# Patient Record
Sex: FEMALE | Race: WHITE | Employment: OTHER | URBAN - NONMETROPOLITAN AREA
[De-identification: names, ages, dates, MRNs, and addresses within clinical notes are randomized per-mention and may not be internally consistent; named-entity substitution may affect disease eponyms.]

---

## 2017-09-02 ENCOUNTER — APPOINTMENT (OUTPATIENT)
Dept: GENERAL RADIOLOGY | Age: 82
DRG: 884 | End: 2017-09-02
Payer: MEDICARE

## 2017-09-02 ENCOUNTER — APPOINTMENT (OUTPATIENT)
Dept: CT IMAGING | Age: 82
DRG: 884 | End: 2017-09-02
Payer: MEDICARE

## 2017-09-02 ENCOUNTER — HOSPITAL ENCOUNTER (INPATIENT)
Age: 82
LOS: 4 days | Discharge: NURSING, REHAB OR LONG-TERM CARE | DRG: 884 | End: 2017-09-06
Attending: EMERGENCY MEDICINE | Admitting: INTERNAL MEDICINE
Payer: MEDICARE

## 2017-09-02 DIAGNOSIS — N39.0 URINARY TRACT INFECTION WITHOUT HEMATURIA, SITE UNSPECIFIED: ICD-10-CM

## 2017-09-02 DIAGNOSIS — R27.0 ATAXIA: Primary | ICD-10-CM

## 2017-09-02 PROBLEM — M25.579 PAIN AND SWELLING OF ANKLE: Status: ACTIVE | Noted: 2017-09-02

## 2017-09-02 PROBLEM — R29.6 MULTIPLE FALLS: Status: ACTIVE | Noted: 2017-09-02

## 2017-09-02 PROBLEM — M25.473 PAIN AND SWELLING OF ANKLE: Status: ACTIVE | Noted: 2017-09-02

## 2017-09-02 PROBLEM — F03.90 DEMENTIA (HCC): Status: ACTIVE | Noted: 2017-09-02

## 2017-09-02 PROBLEM — E03.9 HYPOTHYROIDISM: Status: ACTIVE | Noted: 2017-09-02

## 2017-09-02 PROBLEM — N30.00 ACUTE CYSTITIS: Status: ACTIVE | Noted: 2017-09-02

## 2017-09-02 LAB
ALBUMIN SERPL-MCNC: 3.8 G/DL (ref 3.5–5.1)
ALP BLD-CCNC: 57 U/L (ref 38–126)
ALT SERPL-CCNC: 20 U/L (ref 11–66)
AMMONIA: 28 UMOL/L (ref 11–60)
ANION GAP SERPL CALCULATED.3IONS-SCNC: 14 MEQ/L (ref 8–16)
AST SERPL-CCNC: 42 U/L (ref 5–40)
BACTERIA: ABNORMAL /HPF
BASOPHILS # BLD: 0.4 %
BASOPHILS ABSOLUTE: 0 THOU/MM3 (ref 0–0.1)
BILIRUB SERPL-MCNC: 1 MG/DL (ref 0.3–1.2)
BILIRUBIN DIRECT: < 0.2 MG/DL (ref 0–0.3)
BILIRUBIN URINE: NEGATIVE
BLOOD, URINE: ABNORMAL
BUN BLDV-MCNC: 23 MG/DL (ref 7–22)
CALCIUM SERPL-MCNC: 9.7 MG/DL (ref 8.5–10.5)
CASTS 2: ABNORMAL /LPF
CASTS UA: ABNORMAL /LPF
CHARACTER, URINE: ABNORMAL
CHLORIDE BLD-SCNC: 97 MEQ/L (ref 98–111)
CO2: 24 MEQ/L (ref 23–33)
COLOR: YELLOW
CREAT SERPL-MCNC: 0.7 MG/DL (ref 0.4–1.2)
CRYSTALS, UA: ABNORMAL
EOSINOPHIL # BLD: 0.2 %
EOSINOPHILS ABSOLUTE: 0 THOU/MM3 (ref 0–0.4)
EPITHELIAL CELLS, UA: ABNORMAL /HPF
GFR SERPL CREATININE-BSD FRML MDRD: 80 ML/MIN/1.73M2
GLUCOSE BLD-MCNC: 179 MG/DL (ref 70–108)
GLUCOSE URINE: 250 MG/DL
HCT VFR BLD CALC: 40 % (ref 37–47)
HEMOGLOBIN: 13.9 GM/DL (ref 12–16)
KETONES, URINE: NEGATIVE
LEUKOCYTE ESTERASE, URINE: ABNORMAL
LYMPHOCYTES # BLD: 10.9 %
LYMPHOCYTES ABSOLUTE: 0.8 THOU/MM3 (ref 1–4.8)
MCH RBC QN AUTO: 33.8 PG (ref 27–31)
MCHC RBC AUTO-ENTMCNC: 34.7 GM/DL (ref 33–37)
MCV RBC AUTO: 97.3 FL (ref 81–99)
MISCELLANEOUS 2: ABNORMAL
MONOCYTES # BLD: 10.9 %
MONOCYTES ABSOLUTE: 0.8 THOU/MM3 (ref 0.4–1.3)
NITRITE, URINE: NEGATIVE
NUCLEATED RED BLOOD CELLS: 0 /100 WBC
OSMOLALITY CALCULATION: 278.3 MOSMOL/KG (ref 275–300)
PDW BLD-RTO: 13.9 % (ref 11.5–14.5)
PH UA: 6.5
PLATELET # BLD: 111 THOU/MM3 (ref 130–400)
PMV BLD AUTO: 8.4 MCM (ref 7.4–10.4)
POTASSIUM SERPL-SCNC: 4.5 MEQ/L (ref 3.5–5.2)
PROTEIN UA: NEGATIVE
RBC # BLD: 4.11 MILL/MM3 (ref 4.2–5.4)
RBC # BLD: NORMAL 10*6/UL
RBC URINE: ABNORMAL /HPF
RENAL EPITHELIAL, UA: ABNORMAL
SEG NEUTROPHILS: 77.6 %
SEGMENTED NEUTROPHILS ABSOLUTE COUNT: 5.9 THOU/MM3 (ref 1.8–7.7)
SODIUM BLD-SCNC: 135 MEQ/L (ref 135–145)
SPECIFIC GRAVITY, URINE: 1.02 (ref 1–1.03)
TOTAL PROTEIN: 6.6 G/DL (ref 6.1–8)
TSH SERPL DL<=0.05 MIU/L-ACNC: 2.22 UIU/ML (ref 0.4–4.2)
UROBILINOGEN, URINE: 1 EU/DL
WBC # BLD: 7.6 THOU/MM3 (ref 4.8–10.8)
WBC UA: ABNORMAL /HPF
YEAST: ABNORMAL

## 2017-09-02 PROCEDURE — 80053 COMPREHEN METABOLIC PANEL: CPT

## 2017-09-02 PROCEDURE — 96365 THER/PROPH/DIAG IV INF INIT: CPT

## 2017-09-02 PROCEDURE — 87086 URINE CULTURE/COLONY COUNT: CPT

## 2017-09-02 PROCEDURE — 73630 X-RAY EXAM OF FOOT: CPT

## 2017-09-02 PROCEDURE — 87077 CULTURE AEROBIC IDENTIFY: CPT

## 2017-09-02 PROCEDURE — 99285 EMERGENCY DEPT VISIT HI MDM: CPT

## 2017-09-02 PROCEDURE — 73610 X-RAY EXAM OF ANKLE: CPT

## 2017-09-02 PROCEDURE — 70450 CT HEAD/BRAIN W/O DYE: CPT

## 2017-09-02 PROCEDURE — 85025 COMPLETE CBC W/AUTO DIFF WBC: CPT

## 2017-09-02 PROCEDURE — 82248 BILIRUBIN DIRECT: CPT

## 2017-09-02 PROCEDURE — G0378 HOSPITAL OBSERVATION PER HR: HCPCS

## 2017-09-02 PROCEDURE — 87186 SC STD MICRODIL/AGAR DIL: CPT

## 2017-09-02 PROCEDURE — 87184 SC STD DISK METHOD PER PLATE: CPT

## 2017-09-02 PROCEDURE — 81001 URINALYSIS AUTO W/SCOPE: CPT

## 2017-09-02 PROCEDURE — 36415 COLL VENOUS BLD VENIPUNCTURE: CPT

## 2017-09-02 PROCEDURE — 84443 ASSAY THYROID STIM HORMONE: CPT

## 2017-09-02 PROCEDURE — 93005 ELECTROCARDIOGRAM TRACING: CPT

## 2017-09-02 PROCEDURE — 2060000000 HC ICU INTERMEDIATE R&B

## 2017-09-02 PROCEDURE — 82140 ASSAY OF AMMONIA: CPT

## 2017-09-02 PROCEDURE — 99222 1ST HOSP IP/OBS MODERATE 55: CPT | Performed by: INTERNAL MEDICINE

## 2017-09-02 PROCEDURE — A6212 FOAM DRG <=16 SQ IN W/BORDER: HCPCS

## 2017-09-02 PROCEDURE — 6360000002 HC RX W HCPCS: Performed by: EMERGENCY MEDICINE

## 2017-09-02 RX ORDER — SODIUM CHLORIDE 0.9 % (FLUSH) 0.9 %
10 SYRINGE (ML) INJECTION EVERY 12 HOURS SCHEDULED
Status: DISCONTINUED | OUTPATIENT
Start: 2017-09-02 | End: 2017-09-06 | Stop reason: HOSPADM

## 2017-09-02 RX ORDER — DONEPEZIL HYDROCHLORIDE 5 MG/1
5 TABLET, FILM COATED ORAL NIGHTLY
Status: DISCONTINUED | OUTPATIENT
Start: 2017-09-02 | End: 2017-09-03

## 2017-09-02 RX ORDER — ONDANSETRON 2 MG/ML
4 INJECTION INTRAMUSCULAR; INTRAVENOUS EVERY 6 HOURS PRN
Status: DISCONTINUED | OUTPATIENT
Start: 2017-09-02 | End: 2017-09-03

## 2017-09-02 RX ORDER — LISINOPRIL 20 MG/1
20 TABLET ORAL 2 TIMES DAILY
Status: DISCONTINUED | OUTPATIENT
Start: 2017-09-03 | End: 2017-09-06 | Stop reason: HOSPADM

## 2017-09-02 RX ORDER — ACETAMINOPHEN 325 MG/1
650 TABLET ORAL EVERY 4 HOURS PRN
Status: DISCONTINUED | OUTPATIENT
Start: 2017-09-02 | End: 2017-09-06 | Stop reason: HOSPADM

## 2017-09-02 RX ORDER — GLIPIZIDE 5 MG/1
5 TABLET ORAL DAILY
Status: ON HOLD | COMMUNITY
End: 2021-08-20 | Stop reason: HOSPADM

## 2017-09-02 RX ORDER — DIVALPROEX SODIUM 125 MG/1
125 CAPSULE, COATED PELLETS ORAL NIGHTLY
COMMUNITY

## 2017-09-02 RX ORDER — OXYBUTYNIN CHLORIDE 5 MG/1
5 TABLET, EXTENDED RELEASE ORAL NIGHTLY
Status: DISCONTINUED | OUTPATIENT
Start: 2017-09-02 | End: 2017-09-03

## 2017-09-02 RX ORDER — OYSTER SHELL CALCIUM WITH VITAMIN D 500; 200 MG/1; [IU]/1
1 TABLET, FILM COATED ORAL DAILY
COMMUNITY

## 2017-09-02 RX ORDER — ASPIRIN 81 MG/1
81 TABLET ORAL DAILY
Status: DISCONTINUED | OUTPATIENT
Start: 2017-09-03 | End: 2017-09-03

## 2017-09-02 RX ORDER — SODIUM CHLORIDE 9 MG/ML
INJECTION, SOLUTION INTRAVENOUS CONTINUOUS
Status: DISCONTINUED | OUTPATIENT
Start: 2017-09-02 | End: 2017-09-06 | Stop reason: HOSPADM

## 2017-09-02 RX ORDER — LEVOTHYROXINE SODIUM 0.05 MG/1
50 TABLET ORAL DAILY
Status: DISCONTINUED | OUTPATIENT
Start: 2017-09-03 | End: 2017-09-06 | Stop reason: HOSPADM

## 2017-09-02 RX ORDER — SODIUM CHLORIDE 0.9 % (FLUSH) 0.9 %
10 SYRINGE (ML) INJECTION PRN
Status: DISCONTINUED | OUTPATIENT
Start: 2017-09-02 | End: 2017-09-06 | Stop reason: HOSPADM

## 2017-09-02 RX ORDER — LEVOFLOXACIN 5 MG/ML
500 INJECTION, SOLUTION INTRAVENOUS ONCE
Status: COMPLETED | OUTPATIENT
Start: 2017-09-02 | End: 2017-09-02

## 2017-09-02 RX ORDER — M-VIT,TX,IRON,MINS/CALC/FOLIC 27MG-0.4MG
1 TABLET ORAL DAILY
COMMUNITY

## 2017-09-02 RX ORDER — METOPROLOL SUCCINATE 100 MG/1
100 TABLET, EXTENDED RELEASE ORAL DAILY
Status: DISCONTINUED | OUTPATIENT
Start: 2017-09-03 | End: 2017-09-03

## 2017-09-02 RX ORDER — GLYBURIDE 5 MG/1
5 TABLET ORAL
Status: DISCONTINUED | OUTPATIENT
Start: 2017-09-03 | End: 2017-09-03

## 2017-09-02 RX ORDER — AMLODIPINE BESYLATE 5 MG/1
5 TABLET ORAL DAILY
Status: DISCONTINUED | OUTPATIENT
Start: 2017-09-03 | End: 2017-09-06 | Stop reason: HOSPADM

## 2017-09-02 RX ADMIN — LEVOFLOXACIN 500 MG: 5 INJECTION, SOLUTION INTRAVENOUS at 22:17

## 2017-09-02 ASSESSMENT — PAIN DESCRIPTION - DESCRIPTORS: DESCRIPTORS: DISCOMFORT

## 2017-09-02 ASSESSMENT — PAIN DESCRIPTION - PROGRESSION: CLINICAL_PROGRESSION: NOT CHANGED

## 2017-09-02 ASSESSMENT — PAIN DESCRIPTION - ONSET: ONSET: ON-GOING

## 2017-09-02 ASSESSMENT — PAIN SCALES - GENERAL: PAINLEVEL_OUTOF10: 3

## 2017-09-02 ASSESSMENT — PAIN DESCRIPTION - PAIN TYPE: TYPE: ACUTE PAIN

## 2017-09-02 ASSESSMENT — PAIN DESCRIPTION - ORIENTATION: ORIENTATION: RIGHT

## 2017-09-02 ASSESSMENT — PAIN DESCRIPTION - FREQUENCY: FREQUENCY: INTERMITTENT

## 2017-09-02 ASSESSMENT — PAIN DESCRIPTION - LOCATION: LOCATION: ANKLE

## 2017-09-03 ENCOUNTER — APPOINTMENT (OUTPATIENT)
Dept: GENERAL RADIOLOGY | Age: 82
DRG: 884 | End: 2017-09-03
Payer: MEDICARE

## 2017-09-03 PROBLEM — E11.9 TYPE 2 DIABETES MELLITUS WITHOUT COMPLICATION (HCC): Status: ACTIVE | Noted: 2017-09-03

## 2017-09-03 LAB
ANION GAP SERPL CALCULATED.3IONS-SCNC: 15 MEQ/L (ref 8–16)
BUN BLDV-MCNC: 14 MG/DL (ref 7–22)
CALCIUM SERPL-MCNC: 9 MG/DL (ref 8.5–10.5)
CHLORIDE BLD-SCNC: 97 MEQ/L (ref 98–111)
CO2: 26 MEQ/L (ref 23–33)
CREAT SERPL-MCNC: 0.5 MG/DL (ref 0.4–1.2)
EKG ATRIAL RATE: 77 BPM
EKG P AXIS: 17 DEGREES
EKG P-R INTERVAL: 170 MS
EKG Q-T INTERVAL: 444 MS
EKG QRS DURATION: 154 MS
EKG QTC CALCULATION (BAZETT): 502 MS
EKG R AXIS: -48 DEGREES
EKG T AXIS: 119 DEGREES
EKG VENTRICULAR RATE: 77 BPM
GFR SERPL CREATININE-BSD FRML MDRD: > 90 ML/MIN/1.73M2
GLUCOSE BLD-MCNC: 130 MG/DL (ref 70–108)
POTASSIUM SERPL-SCNC: 3.6 MEQ/L (ref 3.5–5.2)
SODIUM BLD-SCNC: 138 MEQ/L (ref 135–145)

## 2017-09-03 PROCEDURE — 80048 BASIC METABOLIC PNL TOTAL CA: CPT

## 2017-09-03 PROCEDURE — 36415 COLL VENOUS BLD VENIPUNCTURE: CPT

## 2017-09-03 PROCEDURE — 1200000000 HC SEMI PRIVATE

## 2017-09-03 PROCEDURE — 97166 OT EVAL MOD COMPLEX 45 MIN: CPT

## 2017-09-03 PROCEDURE — 99232 SBSQ HOSP IP/OBS MODERATE 35: CPT | Performed by: INTERNAL MEDICINE

## 2017-09-03 PROCEDURE — G0378 HOSPITAL OBSERVATION PER HR: HCPCS

## 2017-09-03 PROCEDURE — G8978 MOBILITY CURRENT STATUS: HCPCS

## 2017-09-03 PROCEDURE — 6370000000 HC RX 637 (ALT 250 FOR IP): Performed by: INTERNAL MEDICINE

## 2017-09-03 PROCEDURE — 97535 SELF CARE MNGMENT TRAINING: CPT

## 2017-09-03 PROCEDURE — G8980 MOBILITY D/C STATUS: HCPCS

## 2017-09-03 PROCEDURE — G8979 MOBILITY GOAL STATUS: HCPCS

## 2017-09-03 PROCEDURE — 2580000003 HC RX 258: Performed by: INTERNAL MEDICINE

## 2017-09-03 PROCEDURE — 72170 X-RAY EXAM OF PELVIS: CPT

## 2017-09-03 RX ORDER — GLIPIZIDE 5 MG/1
5 TABLET ORAL DAILY
Status: DISCONTINUED | OUTPATIENT
Start: 2017-09-03 | End: 2017-09-06 | Stop reason: HOSPADM

## 2017-09-03 RX ORDER — M-VIT,TX,IRON,MINS/CALC/FOLIC 27MG-0.4MG
1 TABLET ORAL DAILY
Status: DISCONTINUED | OUTPATIENT
Start: 2017-09-03 | End: 2017-09-06 | Stop reason: HOSPADM

## 2017-09-03 RX ORDER — DIVALPROEX SODIUM 125 MG/1
125 CAPSULE, COATED PELLETS ORAL NIGHTLY
Status: DISCONTINUED | OUTPATIENT
Start: 2017-09-03 | End: 2017-09-06 | Stop reason: HOSPADM

## 2017-09-03 RX ORDER — OYSTER SHELL CALCIUM WITH VITAMIN D 500; 200 MG/1; [IU]/1
1 TABLET, FILM COATED ORAL DAILY
Status: DISCONTINUED | OUTPATIENT
Start: 2017-09-03 | End: 2017-09-06 | Stop reason: HOSPADM

## 2017-09-03 RX ORDER — METOPROLOL SUCCINATE 50 MG/1
50 TABLET, EXTENDED RELEASE ORAL DAILY
Status: DISCONTINUED | OUTPATIENT
Start: 2017-09-03 | End: 2017-09-06 | Stop reason: HOSPADM

## 2017-09-03 RX ORDER — LEVOFLOXACIN 5 MG/ML
500 INJECTION, SOLUTION INTRAVENOUS EVERY 24 HOURS
Status: DISCONTINUED | OUTPATIENT
Start: 2017-09-04 | End: 2017-09-03

## 2017-09-03 RX ORDER — LEVOFLOXACIN 5 MG/ML
500 INJECTION, SOLUTION INTRAVENOUS EVERY 24 HOURS
Status: DISCONTINUED | OUTPATIENT
Start: 2017-09-03 | End: 2017-09-06 | Stop reason: HOSPADM

## 2017-09-03 RX ADMIN — GLIPIZIDE 5 MG: 5 TABLET ORAL at 08:52

## 2017-09-03 RX ADMIN — METOPROLOL SUCCINATE 50 MG: 100 TABLET, FILM COATED, EXTENDED RELEASE ORAL at 08:50

## 2017-09-03 RX ADMIN — SODIUM CHLORIDE: 9 INJECTION, SOLUTION INTRAVENOUS at 00:57

## 2017-09-03 RX ADMIN — DIVALPROEX SODIUM 125 MG: 125 CAPSULE, COATED PELLETS ORAL at 00:59

## 2017-09-03 RX ADMIN — AMLODIPINE BESYLATE 5 MG: 5 TABLET ORAL at 08:51

## 2017-09-03 RX ADMIN — LISINOPRIL 20 MG: 20 TABLET ORAL at 08:51

## 2017-09-03 RX ADMIN — LEVOTHYROXINE SODIUM 50 MCG: 50 TABLET ORAL at 06:33

## 2017-09-03 RX ADMIN — SODIUM CHLORIDE: 9 INJECTION, SOLUTION INTRAVENOUS at 15:01

## 2017-09-03 RX ADMIN — LISINOPRIL 20 MG: 20 TABLET ORAL at 17:40

## 2017-09-03 RX ADMIN — DIVALPROEX SODIUM 125 MG: 125 CAPSULE, COATED PELLETS ORAL at 21:05

## 2017-09-03 RX ADMIN — CALCIUM CARBONATE-VITAMIN D TAB 500 MG-200 UNIT 1 TABLET: 500-200 TAB at 17:40

## 2017-09-03 RX ADMIN — MULTIPLE VITAMINS W/ MINERALS TAB 1 TABLET: TAB at 17:40

## 2017-09-03 ASSESSMENT — PAIN DESCRIPTION - LOCATION
LOCATION: ANKLE
LOCATION: ANKLE
LOCATION: KNEE
LOCATION: ANKLE

## 2017-09-03 ASSESSMENT — PAIN DESCRIPTION - ONSET
ONSET: ON-GOING
ONSET: ON-GOING

## 2017-09-03 ASSESSMENT — PAIN DESCRIPTION - ORIENTATION
ORIENTATION: LEFT
ORIENTATION: RIGHT

## 2017-09-03 ASSESSMENT — PAIN SCALES - GENERAL
PAINLEVEL_OUTOF10: 2
PAINLEVEL_OUTOF10: 2
PAINLEVEL_OUTOF10: 1
PAINLEVEL_OUTOF10: 1

## 2017-09-03 ASSESSMENT — PAIN DESCRIPTION - FREQUENCY
FREQUENCY: INTERMITTENT

## 2017-09-03 ASSESSMENT — PAIN DESCRIPTION - PAIN TYPE
TYPE: ACUTE PAIN

## 2017-09-03 ASSESSMENT — PAIN DESCRIPTION - DESCRIPTORS
DESCRIPTORS: DISCOMFORT
DESCRIPTORS: SORE
DESCRIPTORS: DISCOMFORT

## 2017-09-03 ASSESSMENT — PAIN DESCRIPTION - PROGRESSION: CLINICAL_PROGRESSION: NOT CHANGED

## 2017-09-04 LAB
ANION GAP SERPL CALCULATED.3IONS-SCNC: 13 MEQ/L (ref 8–16)
BUN BLDV-MCNC: 13 MG/DL (ref 7–22)
CALCIUM SERPL-MCNC: 9.3 MG/DL (ref 8.5–10.5)
CHLORIDE BLD-SCNC: 98 MEQ/L (ref 98–111)
CO2: 22 MEQ/L (ref 23–33)
CREAT SERPL-MCNC: 0.5 MG/DL (ref 0.4–1.2)
GFR SERPL CREATININE-BSD FRML MDRD: > 90 ML/MIN/1.73M2
GLUCOSE BLD-MCNC: 180 MG/DL (ref 70–108)
HCT VFR BLD CALC: 41.1 % (ref 37–47)
HEMOGLOBIN: 14.4 GM/DL (ref 12–16)
MCH RBC QN AUTO: 34.7 PG (ref 27–31)
MCHC RBC AUTO-ENTMCNC: 35 GM/DL (ref 33–37)
MCV RBC AUTO: 99.1 FL (ref 81–99)
ORGANISM: ABNORMAL
PDW BLD-RTO: 13.3 % (ref 11.5–14.5)
PLATELET # BLD: 102 THOU/MM3 (ref 130–400)
PMV BLD AUTO: 8.6 MCM (ref 7.4–10.4)
POTASSIUM SERPL-SCNC: 3.6 MEQ/L (ref 3.5–5.2)
RBC # BLD: 4.14 MILL/MM3 (ref 4.2–5.4)
SODIUM BLD-SCNC: 133 MEQ/L (ref 135–145)
URINE CULTURE REFLEX: ABNORMAL
WBC # BLD: 9.7 THOU/MM3 (ref 4.8–10.8)

## 2017-09-04 PROCEDURE — 36415 COLL VENOUS BLD VENIPUNCTURE: CPT

## 2017-09-04 PROCEDURE — 99232 SBSQ HOSP IP/OBS MODERATE 35: CPT | Performed by: INTERNAL MEDICINE

## 2017-09-04 PROCEDURE — 6370000000 HC RX 637 (ALT 250 FOR IP): Performed by: INTERNAL MEDICINE

## 2017-09-04 PROCEDURE — 2580000003 HC RX 258: Performed by: INTERNAL MEDICINE

## 2017-09-04 PROCEDURE — A6212 FOAM DRG <=16 SQ IN W/BORDER: HCPCS

## 2017-09-04 PROCEDURE — 80048 BASIC METABOLIC PNL TOTAL CA: CPT

## 2017-09-04 PROCEDURE — 1200000000 HC SEMI PRIVATE

## 2017-09-04 PROCEDURE — 85027 COMPLETE CBC AUTOMATED: CPT

## 2017-09-04 PROCEDURE — 6360000002 HC RX W HCPCS: Performed by: INTERNAL MEDICINE

## 2017-09-04 PROCEDURE — 96366 THER/PROPH/DIAG IV INF ADDON: CPT

## 2017-09-04 PROCEDURE — 90670 PCV13 VACCINE IM: CPT | Performed by: INTERNAL MEDICINE

## 2017-09-04 PROCEDURE — G0378 HOSPITAL OBSERVATION PER HR: HCPCS

## 2017-09-04 PROCEDURE — G0009 ADMIN PNEUMOCOCCAL VACCINE: HCPCS | Performed by: INTERNAL MEDICINE

## 2017-09-04 RX ADMIN — GLIPIZIDE 5 MG: 5 TABLET ORAL at 08:59

## 2017-09-04 RX ADMIN — METOPROLOL SUCCINATE 50 MG: 100 TABLET, FILM COATED, EXTENDED RELEASE ORAL at 08:59

## 2017-09-04 RX ADMIN — LEVOFLOXACIN 500 MG: 5 INJECTION, SOLUTION INTRAVENOUS at 22:09

## 2017-09-04 RX ADMIN — Medication 10 ML: at 09:00

## 2017-09-04 RX ADMIN — PNEUMOCOCCAL 13-VALENT CONJUGATE VACCINE 0.5 ML: 2.2; 2.2; 2.2; 2.2; 2.2; 4.4; 2.2; 2.2; 2.2; 2.2; 2.2; 2.2; 2.2 INJECTION, SUSPENSION INTRAMUSCULAR at 09:00

## 2017-09-04 RX ADMIN — SODIUM CHLORIDE: 9 INJECTION, SOLUTION INTRAVENOUS at 13:34

## 2017-09-04 RX ADMIN — LEVOFLOXACIN 500 MG: 5 INJECTION, SOLUTION INTRAVENOUS at 00:35

## 2017-09-04 RX ADMIN — LISINOPRIL 20 MG: 20 TABLET ORAL at 17:29

## 2017-09-04 RX ADMIN — CALCIUM CARBONATE-VITAMIN D TAB 500 MG-200 UNIT 1 TABLET: 500-200 TAB at 08:59

## 2017-09-04 RX ADMIN — MULTIPLE VITAMINS W/ MINERALS TAB 1 TABLET: TAB at 08:59

## 2017-09-04 RX ADMIN — AMLODIPINE BESYLATE 5 MG: 5 TABLET ORAL at 08:59

## 2017-09-04 RX ADMIN — Medication 10 ML: at 00:40

## 2017-09-04 RX ADMIN — DIVALPROEX SODIUM 125 MG: 125 CAPSULE, COATED PELLETS ORAL at 23:22

## 2017-09-04 RX ADMIN — LEVOTHYROXINE SODIUM 50 MCG: 50 TABLET ORAL at 06:50

## 2017-09-04 RX ADMIN — LISINOPRIL 20 MG: 20 TABLET ORAL at 08:59

## 2017-09-04 RX ADMIN — MAGNESIUM HYDROXIDE 30 ML: 400 SUSPENSION ORAL at 19:44

## 2017-09-04 ASSESSMENT — PAIN SCALES - GENERAL
PAINLEVEL_OUTOF10: 0
PAINLEVEL_OUTOF10: 2
PAINLEVEL_OUTOF10: 0

## 2017-09-05 PROCEDURE — G0378 HOSPITAL OBSERVATION PER HR: HCPCS

## 2017-09-05 PROCEDURE — 6370000000 HC RX 637 (ALT 250 FOR IP): Performed by: INTERNAL MEDICINE

## 2017-09-05 PROCEDURE — 99232 SBSQ HOSP IP/OBS MODERATE 35: CPT | Performed by: INTERNAL MEDICINE

## 2017-09-05 PROCEDURE — 6360000002 HC RX W HCPCS: Performed by: INTERNAL MEDICINE

## 2017-09-05 PROCEDURE — 1200000000 HC SEMI PRIVATE

## 2017-09-05 PROCEDURE — G8979 MOBILITY GOAL STATUS: HCPCS

## 2017-09-05 PROCEDURE — G8978 MOBILITY CURRENT STATUS: HCPCS

## 2017-09-05 PROCEDURE — 2580000003 HC RX 258: Performed by: INTERNAL MEDICINE

## 2017-09-05 PROCEDURE — 96366 THER/PROPH/DIAG IV INF ADDON: CPT

## 2017-09-05 PROCEDURE — 97162 PT EVAL MOD COMPLEX 30 MIN: CPT

## 2017-09-05 PROCEDURE — 97530 THERAPEUTIC ACTIVITIES: CPT

## 2017-09-05 RX ADMIN — AMLODIPINE BESYLATE 5 MG: 5 TABLET ORAL at 09:14

## 2017-09-05 RX ADMIN — MULTIPLE VITAMINS W/ MINERALS TAB 1 TABLET: TAB at 09:16

## 2017-09-05 RX ADMIN — DIVALPROEX SODIUM 125 MG: 125 CAPSULE, COATED PELLETS ORAL at 20:47

## 2017-09-05 RX ADMIN — GLIPIZIDE 5 MG: 5 TABLET ORAL at 09:15

## 2017-09-05 RX ADMIN — LISINOPRIL 20 MG: 20 TABLET ORAL at 09:25

## 2017-09-05 RX ADMIN — LEVOTHYROXINE SODIUM 50 MCG: 50 TABLET ORAL at 06:28

## 2017-09-05 RX ADMIN — CALCIUM CARBONATE-VITAMIN D TAB 500 MG-200 UNIT 1 TABLET: 500-200 TAB at 09:16

## 2017-09-05 RX ADMIN — SODIUM CHLORIDE: 9 INJECTION, SOLUTION INTRAVENOUS at 03:17

## 2017-09-05 RX ADMIN — METOPROLOL SUCCINATE 50 MG: 100 TABLET, FILM COATED, EXTENDED RELEASE ORAL at 09:15

## 2017-09-05 RX ADMIN — LISINOPRIL 20 MG: 20 TABLET ORAL at 17:48

## 2017-09-05 RX ADMIN — SODIUM CHLORIDE: 9 INJECTION, SOLUTION INTRAVENOUS at 17:49

## 2017-09-05 RX ADMIN — LEVOFLOXACIN 500 MG: 5 INJECTION, SOLUTION INTRAVENOUS at 21:48

## 2017-09-05 ASSESSMENT — PAIN DESCRIPTION - ORIENTATION
ORIENTATION: RIGHT

## 2017-09-05 ASSESSMENT — PAIN DESCRIPTION - LOCATION
LOCATION: ANKLE

## 2017-09-05 ASSESSMENT — PAIN DESCRIPTION - FREQUENCY
FREQUENCY: INTERMITTENT

## 2017-09-05 ASSESSMENT — PAIN SCALES - GENERAL
PAINLEVEL_OUTOF10: 0

## 2017-09-05 ASSESSMENT — PAIN DESCRIPTION - PAIN TYPE
TYPE: ACUTE PAIN

## 2017-09-05 ASSESSMENT — PAIN DESCRIPTION - ONSET
ONSET: ON-GOING

## 2017-09-06 VITALS
DIASTOLIC BLOOD PRESSURE: 65 MMHG | WEIGHT: 110.4 LBS | SYSTOLIC BLOOD PRESSURE: 141 MMHG | OXYGEN SATURATION: 96 % | HEIGHT: 64 IN | HEART RATE: 70 BPM | BODY MASS INDEX: 18.85 KG/M2 | RESPIRATION RATE: 16 BRPM | TEMPERATURE: 98 F

## 2017-09-06 PROCEDURE — 6370000000 HC RX 637 (ALT 250 FOR IP): Performed by: INTERNAL MEDICINE

## 2017-09-06 PROCEDURE — G0378 HOSPITAL OBSERVATION PER HR: HCPCS

## 2017-09-06 PROCEDURE — 97110 THERAPEUTIC EXERCISES: CPT

## 2017-09-06 PROCEDURE — 99239 HOSP IP/OBS DSCHRG MGMT >30: CPT | Performed by: INTERNAL MEDICINE

## 2017-09-06 RX ORDER — LEVOFLOXACIN 500 MG/1
500 TABLET, FILM COATED ORAL DAILY
Qty: 5 TABLET | Refills: 0 | DISCHARGE
Start: 2017-09-06 | End: 2017-09-11

## 2017-09-06 RX ADMIN — CALCIUM CARBONATE-VITAMIN D TAB 500 MG-200 UNIT 1 TABLET: 500-200 TAB at 08:12

## 2017-09-06 RX ADMIN — MULTIPLE VITAMINS W/ MINERALS TAB 1 TABLET: TAB at 08:12

## 2017-09-06 RX ADMIN — LEVOTHYROXINE SODIUM 50 MCG: 50 TABLET ORAL at 05:45

## 2017-09-06 RX ADMIN — AMLODIPINE BESYLATE 5 MG: 5 TABLET ORAL at 08:12

## 2017-09-06 RX ADMIN — METOPROLOL SUCCINATE 50 MG: 100 TABLET, FILM COATED, EXTENDED RELEASE ORAL at 08:12

## 2017-09-06 RX ADMIN — GLIPIZIDE 5 MG: 5 TABLET ORAL at 08:12

## 2017-09-06 RX ADMIN — LISINOPRIL 20 MG: 20 TABLET ORAL at 08:12

## 2017-09-06 ASSESSMENT — PAIN SCALES - GENERAL
PAINLEVEL_OUTOF10: 0

## 2021-08-18 ENCOUNTER — HOSPITAL ENCOUNTER (INPATIENT)
Age: 86
LOS: 2 days | Discharge: NURSING, REHAB OR LONG-TERM CARE | DRG: 689 | End: 2021-08-20
Attending: EMERGENCY MEDICINE | Admitting: FAMILY MEDICINE
Payer: MEDICARE

## 2021-08-18 ENCOUNTER — APPOINTMENT (OUTPATIENT)
Dept: GENERAL RADIOLOGY | Age: 86
DRG: 689 | End: 2021-08-18
Payer: MEDICARE

## 2021-08-18 ENCOUNTER — APPOINTMENT (OUTPATIENT)
Dept: CT IMAGING | Age: 86
DRG: 689 | End: 2021-08-18
Payer: MEDICARE

## 2021-08-18 DIAGNOSIS — R77.8 ELEVATED TROPONIN LEVEL: ICD-10-CM

## 2021-08-18 DIAGNOSIS — R41.82 ALTERED MENTAL STATUS, UNSPECIFIED ALTERED MENTAL STATUS TYPE: Primary | ICD-10-CM

## 2021-08-18 PROBLEM — I21.4 NSTEMI (NON-ST ELEVATED MYOCARDIAL INFARCTION) (HCC): Status: ACTIVE | Noted: 2021-08-18

## 2021-08-18 LAB
ANION GAP SERPL CALCULATED.3IONS-SCNC: 11 MEQ/L (ref 8–16)
APTT: 31.6 SECONDS (ref 22–38)
BACTERIA: ABNORMAL
BASOPHILS # BLD: 0.1 %
BASOPHILS ABSOLUTE: 0 THOU/MM3 (ref 0–0.1)
BILIRUBIN URINE: NEGATIVE
BLOOD, URINE: ABNORMAL
BUN BLDV-MCNC: 18 MG/DL (ref 7–22)
CALCIUM SERPL-MCNC: 9.2 MG/DL (ref 8.5–10.5)
CASTS: ABNORMAL /LPF
CASTS: ABNORMAL /LPF
CHARACTER, URINE: ABNORMAL
CHLORIDE BLD-SCNC: 95 MEQ/L (ref 98–111)
CO2: 28 MEQ/L (ref 23–33)
COLOR: YELLOW
CREAT SERPL-MCNC: 0.8 MG/DL (ref 0.4–1.2)
CRYSTALS: ABNORMAL
EOSINOPHIL # BLD: 0 %
EOSINOPHILS ABSOLUTE: 0 THOU/MM3 (ref 0–0.4)
EPITHELIAL CELLS, UA: ABNORMAL /HPF
ERYTHROCYTE [DISTWIDTH] IN BLOOD BY AUTOMATED COUNT: 12.5 % (ref 11.5–14.5)
ERYTHROCYTE [DISTWIDTH] IN BLOOD BY AUTOMATED COUNT: 45.7 FL (ref 35–45)
FLU A ANTIGEN: NEGATIVE
FLU B ANTIGEN: NEGATIVE
GFR SERPL CREATININE-BSD FRML MDRD: 68 ML/MIN/1.73M2
GLUCOSE BLD-MCNC: 204 MG/DL (ref 70–108)
GLUCOSE BLD-MCNC: 268 MG/DL (ref 70–108)
GLUCOSE, URINE: >= 1000 MG/DL
HCT VFR BLD CALC: 41.3 % (ref 37–47)
HEMOGLOBIN: 13.9 GM/DL (ref 12–16)
IMMATURE GRANS (ABS): 0.06 THOU/MM3 (ref 0–0.07)
IMMATURE GRANULOCYTES: 0.7 %
INR BLD: 1.09 (ref 0.85–1.13)
KETONES, URINE: ABNORMAL
LEUKOCYTE ESTERASE, URINE: ABNORMAL
LYMPHOCYTES # BLD: 14 %
LYMPHOCYTES ABSOLUTE: 1.2 THOU/MM3 (ref 1–4.8)
MCH RBC QN AUTO: 33.7 PG (ref 26–33)
MCHC RBC AUTO-ENTMCNC: 33.7 GM/DL (ref 32.2–35.5)
MCV RBC AUTO: 100.2 FL (ref 81–99)
MISCELLANEOUS LAB TEST RESULT: ABNORMAL
MONOCYTES # BLD: 10.3 %
MONOCYTES ABSOLUTE: 0.9 THOU/MM3 (ref 0.4–1.3)
NITRITE, URINE: NEGATIVE
NUCLEATED RED BLOOD CELLS: 0 /100 WBC
OSMOLALITY CALCULATION: 279.6 MOSMOL/KG (ref 275–300)
PH UA: 6.5 (ref 5–9)
PLATELET # BLD: 127 THOU/MM3 (ref 130–400)
PMV BLD AUTO: 9.4 FL (ref 9.4–12.4)
POTASSIUM REFLEX MAGNESIUM: 4.7 MEQ/L (ref 3.5–5.2)
PROTEIN UA: 100 MG/DL
RBC # BLD: 4.12 MILL/MM3 (ref 4.2–5.4)
RBC URINE: ABNORMAL /HPF
RENAL EPITHELIAL, UA: ABNORMAL
SARS-COV-2, NAAT: NOT DETECTED
SEG NEUTROPHILS: 74.9 %
SEGMENTED NEUTROPHILS ABSOLUTE COUNT: 6.2 THOU/MM3 (ref 1.8–7.7)
SODIUM BLD-SCNC: 134 MEQ/L (ref 135–145)
SPECIFIC GRAVITY UA: 1.02 (ref 1–1.03)
TROPONIN T: 0.03 NG/ML
TROPONIN T: 0.03 NG/ML
UROBILINOGEN, URINE: 1 EU/DL (ref 0–1)
WBC # BLD: 8.3 THOU/MM3 (ref 4.8–10.8)
WBC UA: > 200 /HPF
YEAST: ABNORMAL

## 2021-08-18 PROCEDURE — 84484 ASSAY OF TROPONIN QUANT: CPT

## 2021-08-18 PROCEDURE — 81001 URINALYSIS AUTO W/SCOPE: CPT

## 2021-08-18 PROCEDURE — G0378 HOSPITAL OBSERVATION PER HR: HCPCS

## 2021-08-18 PROCEDURE — 70450 CT HEAD/BRAIN W/O DYE: CPT

## 2021-08-18 PROCEDURE — 85025 COMPLETE CBC W/AUTO DIFF WBC: CPT

## 2021-08-18 PROCEDURE — 80048 BASIC METABOLIC PNL TOTAL CA: CPT

## 2021-08-18 PROCEDURE — 6370000000 HC RX 637 (ALT 250 FOR IP): Performed by: INTERNAL MEDICINE

## 2021-08-18 PROCEDURE — 99284 EMERGENCY DEPT VISIT MOD MDM: CPT

## 2021-08-18 PROCEDURE — 2140000000 HC CCU INTERMEDIATE R&B

## 2021-08-18 PROCEDURE — 85610 PROTHROMBIN TIME: CPT

## 2021-08-18 PROCEDURE — 99223 1ST HOSP IP/OBS HIGH 75: CPT | Performed by: INTERNAL MEDICINE

## 2021-08-18 PROCEDURE — 82948 REAGENT STRIP/BLOOD GLUCOSE: CPT

## 2021-08-18 PROCEDURE — 93005 ELECTROCARDIOGRAM TRACING: CPT | Performed by: NURSE PRACTITIONER

## 2021-08-18 PROCEDURE — 36415 COLL VENOUS BLD VENIPUNCTURE: CPT

## 2021-08-18 PROCEDURE — 87804 INFLUENZA ASSAY W/OPTIC: CPT

## 2021-08-18 PROCEDURE — 85730 THROMBOPLASTIN TIME PARTIAL: CPT

## 2021-08-18 PROCEDURE — 87086 URINE CULTURE/COLONY COUNT: CPT

## 2021-08-18 PROCEDURE — 87635 SARS-COV-2 COVID-19 AMP PRB: CPT

## 2021-08-18 PROCEDURE — 71046 X-RAY EXAM CHEST 2 VIEWS: CPT

## 2021-08-18 RX ORDER — ASPIRIN 81 MG/1
324 TABLET, CHEWABLE ORAL ONCE
Status: COMPLETED | OUTPATIENT
Start: 2021-08-18 | End: 2021-08-19

## 2021-08-18 RX ORDER — LISINOPRIL 20 MG/1
20 TABLET ORAL 2 TIMES DAILY
Status: DISCONTINUED | OUTPATIENT
Start: 2021-08-18 | End: 2021-08-20 | Stop reason: HOSPADM

## 2021-08-18 RX ORDER — DEXTROSE MONOHYDRATE 25 G/50ML
12.5 INJECTION, SOLUTION INTRAVENOUS PRN
Status: DISCONTINUED | OUTPATIENT
Start: 2021-08-18 | End: 2021-08-20 | Stop reason: HOSPADM

## 2021-08-18 RX ORDER — SODIUM CHLORIDE 9 MG/ML
25 INJECTION, SOLUTION INTRAVENOUS PRN
Status: DISCONTINUED | OUTPATIENT
Start: 2021-08-18 | End: 2021-08-20 | Stop reason: HOSPADM

## 2021-08-18 RX ORDER — SODIUM CHLORIDE 0.9 % (FLUSH) 0.9 %
10 SYRINGE (ML) INJECTION EVERY 12 HOURS SCHEDULED
Status: DISCONTINUED | OUTPATIENT
Start: 2021-08-18 | End: 2021-08-20 | Stop reason: HOSPADM

## 2021-08-18 RX ORDER — DEXTROSE MONOHYDRATE 50 MG/ML
100 INJECTION, SOLUTION INTRAVENOUS PRN
Status: DISCONTINUED | OUTPATIENT
Start: 2021-08-18 | End: 2021-08-20 | Stop reason: HOSPADM

## 2021-08-18 RX ORDER — AMLODIPINE BESYLATE 5 MG/1
5 TABLET ORAL DAILY
Status: DISCONTINUED | OUTPATIENT
Start: 2021-08-19 | End: 2021-08-20 | Stop reason: HOSPADM

## 2021-08-18 RX ORDER — ONDANSETRON 4 MG/1
4 TABLET, ORALLY DISINTEGRATING ORAL EVERY 8 HOURS PRN
Status: DISCONTINUED | OUTPATIENT
Start: 2021-08-18 | End: 2021-08-20 | Stop reason: HOSPADM

## 2021-08-18 RX ORDER — SODIUM CHLORIDE 0.9 % (FLUSH) 0.9 %
10 SYRINGE (ML) INJECTION PRN
Status: DISCONTINUED | OUTPATIENT
Start: 2021-08-18 | End: 2021-08-20 | Stop reason: HOSPADM

## 2021-08-18 RX ORDER — GLIPIZIDE 5 MG/1
5 TABLET ORAL
Status: DISCONTINUED | OUTPATIENT
Start: 2021-08-19 | End: 2021-08-20 | Stop reason: HOSPADM

## 2021-08-18 RX ORDER — METOPROLOL SUCCINATE 50 MG/1
50 TABLET, EXTENDED RELEASE ORAL DAILY
Status: DISCONTINUED | OUTPATIENT
Start: 2021-08-19 | End: 2021-08-19

## 2021-08-18 RX ORDER — LEVOFLOXACIN 5 MG/ML
500 INJECTION, SOLUTION INTRAVENOUS EVERY 24 HOURS
Status: DISCONTINUED | OUTPATIENT
Start: 2021-08-18 | End: 2021-08-19

## 2021-08-18 RX ORDER — LEVOTHYROXINE SODIUM 0.05 MG/1
50 TABLET ORAL DAILY
Status: DISCONTINUED | OUTPATIENT
Start: 2021-08-19 | End: 2021-08-20 | Stop reason: HOSPADM

## 2021-08-18 RX ORDER — POLYETHYLENE GLYCOL 3350 17 G/17G
17 POWDER, FOR SOLUTION ORAL DAILY PRN
Status: DISCONTINUED | OUTPATIENT
Start: 2021-08-18 | End: 2021-08-20 | Stop reason: HOSPADM

## 2021-08-18 RX ORDER — ACETAMINOPHEN 325 MG/1
650 TABLET ORAL EVERY 6 HOURS PRN
Status: DISCONTINUED | OUTPATIENT
Start: 2021-08-18 | End: 2021-08-20 | Stop reason: HOSPADM

## 2021-08-18 RX ORDER — NICOTINE POLACRILEX 4 MG
15 LOZENGE BUCCAL PRN
Status: DISCONTINUED | OUTPATIENT
Start: 2021-08-18 | End: 2021-08-20 | Stop reason: HOSPADM

## 2021-08-18 RX ORDER — ONDANSETRON 2 MG/ML
4 INJECTION INTRAMUSCULAR; INTRAVENOUS EVERY 6 HOURS PRN
Status: DISCONTINUED | OUTPATIENT
Start: 2021-08-18 | End: 2021-08-20 | Stop reason: HOSPADM

## 2021-08-18 RX ORDER — ACETAMINOPHEN 650 MG/1
650 SUPPOSITORY RECTAL EVERY 6 HOURS PRN
Status: DISCONTINUED | OUTPATIENT
Start: 2021-08-18 | End: 2021-08-20 | Stop reason: HOSPADM

## 2021-08-18 RX ORDER — METFORMIN HYDROCHLORIDE 500 MG/1
500 TABLET, EXTENDED RELEASE ORAL
COMMUNITY

## 2021-08-18 RX ORDER — DIVALPROEX SODIUM 125 MG/1
125 CAPSULE, COATED PELLETS ORAL NIGHTLY
Status: DISCONTINUED | OUTPATIENT
Start: 2021-08-19 | End: 2021-08-20 | Stop reason: HOSPADM

## 2021-08-18 RX ADMIN — METOPROLOL TARTRATE 12.5 MG: 25 TABLET, FILM COATED ORAL at 21:19

## 2021-08-18 ASSESSMENT — ENCOUNTER SYMPTOMS
SORE THROAT: 0
EYES NEGATIVE: 1
CONSTIPATION: 0
NAUSEA: 0
GASTROINTESTINAL NEGATIVE: 1
SHORTNESS OF BREATH: 0
ABDOMINAL DISTENTION: 0
COUGH: 0
RHINORRHEA: 0
WHEEZING: 0
DIARRHEA: 0
VOMITING: 0
RESPIRATORY NEGATIVE: 1
COLOR CHANGE: 0

## 2021-08-18 NOTE — ED PROVIDER NOTES
[facility] ENCOUNTER          Pt Name: Niurka Yeung  MRN: [id number]  [place] 1934  Date of evaluation: 8/18/2021  Treating Resident Physician: Marylou Lezama MD  Supervising Physician: Dominic Goetz, [address] [place] [address]       Chief Complaint   Patient presents with    Fatigue     History obtained from the patient, patient's family, and report from EMS and nursing home. HISTORY OF PRESENT ILLNESS    HPI  Niurka Yeung is a 80 y.o. female who presents to the emergency department for evaluation of her mental status. Per report patient was at nursing home today and appeared to be staring into space and drooling for approximately 5 to 10 minutes. When spoken to she would not respond. Per report of nursing home patient has been more confused than usual today. Patient denies any pain or discomfort. Patient denies any chest pain, shortness of breath, or syncope. The patient has no other acute complaints at this time. REVIEW OF SYSTEMS   Review of Systems   Constitutional: Positive for fatigue. Negative for fever. HENT: Negative for ear pain, rhinorrhea and sore throat. Respiratory: Negative for cough, shortness of breath and wheezing. Cardiovascular: Negative for chest pain, palpitations and leg swelling. Gastrointestinal: Negative for abdominal distention, constipation, diarrhea, nausea and vomiting. Endocrine: Negative for polydipsia and polyuria. Genitourinary: Negative for difficulty urinating and dysuria. Musculoskeletal: Negative for arthralgias. Skin: Negative for color change, pallor and rash. Neurological: Negative for dizziness, seizures, syncope, weakness and numbness. Episode of staring drooling that lasted approximately 5 to 10 minutes. Increased confusion per nursing home report.          PAST MEDICAL AND SURGICAL HISTORY     Past Medical History:   Diagnosis Date    Diabetes mellitus ([facility]  Hypertension     Thyroid disease      Past Surgical History:   Procedure Laterality Date    CARDIAC CATHETERIZATION  2008? ?    normal results    COLONOSCOPY      ENDOSCOPY, COLON, DIAGNOSTIC  2004? ?    checking for ulcers    HYSTERECTOMY  mid 1980's? ?    partial    SKIN BIOPSY Right 8/21/13    EXCISION BCC RT EAR         MEDICATIONS   No current facility-administered medications for this encounter. Current Outpatient Medications:     glipiZIDE (GLUCOTROL) 5 MG tablet, Take 5 mg by mouth daily, Disp: , Rfl:     Multiple Vitamins-Minerals (THERAPEUTIC MULTIVITAMIN-MINERALS) tablet, Take 1 tablet by mouth daily, Disp: , Rfl:     calcium-vitamin D (OSCAL-500) 500-200 MG-UNIT per tablet, Take 1 tablet by mouth daily Calcium 600 mg and  D3 800 IU, Disp: , Rfl:     divalproex (DEPAKOTE SPRINKLE) 125 MG capsule, Take 125 mg by mouth nightly, Disp: , Rfl:     levothyroxine (SYNTHROID) 50 MCG tablet, Take 50 mcg by mouth Daily. , Disp: , Rfl:     amLODIPine (NORVASC) 5 MG tablet, Take 5 mg by mouth daily. , Disp: , Rfl:     metoprolol (TOPROL-XL) 100 MG XL tablet, Take 50 mg by mouth daily , Disp: , Rfl:     lisinopril (PRINIVIL;ZESTRIL) 20 MG tablet, Take 20 mg by mouth 2 times daily. , Disp: , Rfl:       SOCIAL HISTORY     Social History     Social History Narrative    Not on file     Social History     Tobacco Use    Smoking status: Never Smoker    Smokeless tobacco: Never Used   Substance Use Topics    Alcohol use: No    Drug use: No         ALLERGIES     Allergies   Allergen Reactions    Pcn [Penicillins] Rash         FAMILY HISTORY     Family History   Problem Relation Age of Onset    Heart Disease Father     Heart Disease Brother     High Blood Pressure Brother     Diabetes Brother     High Blood Pressure Sister     High Blood Pressure Sister     Diabetes Sister          PREVIOUS RECORDS   Previous records reviewed:  Today    PHYSICAL EXAM     ED Triage Vitals   BP Temp Temp src Pulse Resp SpO2 Height Weight   08/18/21 1427 08/18/21 1643 -- 08/18/21 1427 08/18/21 1427 08/18/21 1427 -- 08/18/21 1427   (!) 163/77 98.6 °F (37 °C)  73 18 97 %  110 lb (49.9 kg)     Initial vital signs and nursing assessment reviewed and abnormal from hypertension. Body mass index is 18.88 kg/m². Pulsoximetry is normal per my interpretation. Additional Vital Signs:  Vitals:    08/18/21 1643   BP: (!) 140/59   Pulse: 76   Resp: 17   Temp: 98.6 °F (37 °C)   SpO2: 98%       Physical Exam  Constitutional:       Appearance: Normal appearance. HENT:      Head: Normocephalic. Right Ear: External ear normal.      Left Ear: External ear normal.      Nose: Nose normal.      Mouth/Throat:      Mouth: Mucous membranes are moist.      Pharynx: Oropharynx is clear. Eyes:      Extraocular Movements: Extraocular movements intact. Conjunctiva/sclera: Conjunctivae normal.      Pupils: Pupils are equal, round, and reactive to light. Cardiovascular:      Rate and Rhythm: Normal rate and regular rhythm. Pulses: Normal pulses. Heart sounds: Normal heart sounds. Pulmonary:      Effort: Pulmonary effort is normal.      Breath sounds: Normal breath sounds. Abdominal:      General: Bowel sounds are normal.      Palpations: Abdomen is soft. Musculoskeletal:         General: Normal range of motion. Cervical back: Normal range of motion and neck supple. Skin:     General: Skin is warm and dry. Capillary Refill: Capillary refill takes less than 2 seconds. Neurological:      General: No focal deficit present. Mental Status: She is alert and oriented to person, place, and time. Mental status is at baseline.              MEDICAL DECISION MAKING   Initial Assessment:   Patient is a 27-year-old female with past medical history of dementia, hypothyroidism, and type 2 diabetes who presents to the ED today with report of increased confusion and episode of drooling and staring into space per report of nursing home. Patient on exam is not altered at this time and is answering questions appropriately with a GCS of 15 and no focal deficits. Patient had diagnostic interventions that showed no acute abnormalities with the exception of elevated troponin at this time. There are no previous studies to compare level. Patient differential diagnosis includes but is not limited to altered mental status, transient AMS, CVA, TIA, worsening of dementia, electrolyte abnormality, dehydration,. Urinary tract infection, viral infection, MI, and abnormally elevated troponin. Aced on patient's history physical exam and diagnostic findings patient will be admitted at this time for altered mental status that was transient and elevated troponin. Plan:       Patient will need to be admitted to the hospital for further evaluation of elevated troponin at this time and transient altered mental status. ED RESULTS   Laboratory results:  Labs Reviewed   CBC WITH AUTO DIFFERENTIAL - Abnormal; Notable for the following components:       Result Value    RBC 4.12 (*)     .2 (*)     MCH 33.7 (*)     RDW-SD 45.7 (*)     Platelets 679 (*)     All other components within normal limits   BASIC METABOLIC PANEL W/ REFLEX TO MG FOR LOW K - Abnormal; Notable for the following components:    Sodium 134 (*)     Chloride 95 (*)     Glucose 268 (*)     All other components within normal limits   TROPONIN - Abnormal; Notable for the following components:    Troponin T 0.027 (*)     All other components within normal limits   GLOMERULAR FILTRATION RATE, ESTIMATED - Abnormal; Notable for the following components:    Est, Glom Filt Rate 68 (*)     All other components within normal limits   CULTURE, URINE   RAPID INFLUENZA A/B ANTIGENS   COVID-19, RAPID   ANION GAP   OSMOLALITY   URINALYSIS WITH MICROSCOPIC       Radiologic studies results:  XR CHEST (2 VW)   Final Result   There is no acute intrathoracic process.                **This report has been created using voice recognition software. It may contain minor errors which are inherent in voice recognition technology. **      Final report electronically signed by Dr Caden Garcia on 8/18/2021 4:00 PM      CT HEAD WO CONTRAST   Final Result      1. No acute intracranial hemorrhage, mass effect or midline shift. 2. Chronic senescent changes of the brain including generalized atrophy and chronic microvascular ischemic changes in the white matter. **This report has been created using voice recognition software. It may contain minor errors which are inherent in voice recognition technology. **      Final report electronically signed by Dr Caden Garcia on 8/18/2021 3:45 PM          ED Medications administered this visit: Medications - No data to display      ED COURSE      Patient had diagnostics include CT of head and chest x-ray that showed no acute abnormality. Patient had diagnostic labs that showed an elevated troponin at this time. MEDICATION CHANGES     New Prescriptions    No medications on file         FINAL DISPOSITION     Final diagnoses: Altered mental status, unspecified altered mental status type   Elevated troponin level     Condition: condition: fair  Dispo: Admit to telemetry      This transcription was electronically signed. Parts of this transcriptions may have been dictated by use of voice recognition software and electronically transcribed, and parts may have been transcribed with the assistance of an ED scribe. The transcription may contain errors not detected in proofreading. Please refer to my supervising physician's documentation if my documentation differs.     Electronically Signed: Edu Weber MD, 08/18/21, 5:39 PM       Edu Weber MD  Resident  08/18/21 2482

## 2021-08-18 NOTE — ED NOTES
Bed: 003A  Expected date: 8/18/21  Expected time: 2:04 PM  Means of arrival: [facility]  Comments:     Tony Mkceon RN  08/18/21 1419

## 2021-08-18 NOTE — ED NOTES
Patient is resting in bed with easy and unlabored respirations. Call light in reach. Side rails up x2. Patient denies further complaints or concerns. Will monitor.         Aashish Gonzalez RN  08/18/21 Therese Randolph

## 2021-08-18 NOTE — ED NOTES
Patient is resting in bed with easy and unlabored respirations. Call light in reach. Side rails up x2. Patient denies further complaints or concerns. Will monitor.         Mau Atkins RN  08/18/21 1125

## 2021-08-18 NOTE — ED NOTES
ED to inpatient nurses report    Chief Complaint   Patient presents with    Fatigue      Present to ED from nursing home  LOC: alert and orientated to name and place  Vital signs   Vitals:    08/18/21 1427 08/18/21 1643 08/18/21 1819   BP: (!) 163/77 (!) 140/59 (!) 132/55   Pulse: 73 76 78   Resp: 18 17 16   Temp:  98.6 °F (37 °C)    SpO2: 97% 98% 97%   Weight: 110 lb (49.9 kg)        Oxygen Baseline 97    Current needs required room air Bipap/Cpap No  LDAs:   Peripheral IV 08/18/21 Right Forearm (Active)   Site Assessment Clean;Dry; Intact 08/18/21 1820   Line Status Flushed 08/18/21 1820   Dressing Status Clean;Dry; Intact 08/18/21 1820   Dressing Intervention New 08/18/21 1820     Mobility: Fully dependent  Pending ED orders: N/a  Present condition: Stable    Electronically signed by Raylene Claude, RN on 8/18/2021 at 7:07 PM       Raylene Claude, RN  08/18/21 [id number]

## 2021-08-18 NOTE — H&P
HISTORY AND PHYSICAL             Date: 8/18/2021        Patient Name: [place]     YOB: 1934      Age:  80 y.o. Chief Complaint     Chief Complaint   Patient presents with    Fatigue        History Obtained From   patient    History of Present Illness   [place] is a 80 y.o. female who presents to the emergency department for evaluation of her mental status. Per report patient was at nursing home today and appeared to be staring into space and drooling for approximately 5 to 10 minutes. When spoken to she would not respond. Per report of nursing home patient has been more confused than usual today. Patient denies any pain or discomfort. Patient denies any chest pain, shortness of breath, or syncope. The patient has no other acute complaints at this time. Past Medical History     Past Medical History:   Diagnosis Date    Diabetes mellitus ([address]     Hypertension     Thyroid disease         Past Surgical History     Past Surgical History:   Procedure Laterality Date    CARDIAC CATHETERIZATION  2008? ?    normal results    COLONOSCOPY      ENDOSCOPY, COLON, DIAGNOSTIC  2004? ?    checking for ulcers    HYSTERECTOMY  mid 1980's? ?    partial    SKIN BIOPSY Right 8/21/13    EXCISION BCC RT EAR        Medications Prior to Admission     Prior to Admission medications    Medication Sig Start Date End Date Taking? Authorizing Provider   glipiZIDE (GLUCOTROL) 5 MG tablet Take 5 mg by mouth daily   Yes Historical Provider, MD   Multiple Vitamins-Minerals (THERAPEUTIC MULTIVITAMIN-MINERALS) tablet Take 1 tablet by mouth daily   Yes Historical Provider, MD   calcium-vitamin D (OSCAL-500) 500-200 MG-UNIT per tablet Take 1 tablet by mouth daily Calcium 600 mg and   D3 800 IU   Yes Historical Provider, MD   divalproex (DEPAKOTE SPRINKLE) 125 MG capsule Take 125 mg by mouth nightly   Yes Historical Provider, MD   levothyroxine (SYNTHROID) 50 MCG tablet Take 50 mcg by mouth Daily.    Yes Historical Provider, MD   amLODIPine (NORVASC) 5 MG tablet Take 5 mg by mouth daily. Yes Historical Provider, MD   metoprolol (TOPROL-XL) 100 MG XL tablet Take 50 mg by mouth daily    Yes Historical Provider, MD   lisinopril (PRINIVIL;ZESTRIL) 20 MG tablet Take 20 mg by mouth 2 times daily. Yes Historical Provider, MD        Allergies   Pcn [penicillins]    Social History     Social History     Tobacco History     Smoking Status  Never Smoker    Smokeless Tobacco Use  Never Used          Alcohol History     Alcohol Use Status  No          Drug Use     Drug Use Status  No          Sexual Activity     Sexually Active  Not Asked                Family History     Family History   Problem Relation Age of Onset    Heart Disease Father     Heart Disease Brother     High Blood Pressure Brother     Diabetes Brother     High Blood Pressure Sister     High Blood Pressure Sister     Diabetes Sister        Review of Systems   Review of Systems   Constitutional: Positive for activity change, appetite change and fatigue. Negative for fever. HENT: Negative. Eyes: Negative. Respiratory: Negative. Cardiovascular: Negative. Gastrointestinal: Negative. Endocrine: Negative. Genitourinary: Negative. Musculoskeletal: Negative. Neurological: Positive for dizziness and weakness. Hematological: Negative. Psychiatric/Behavioral: Negative. Physical Exam   BP (!) 132/55   Pulse 78   Temp 98.6 °F (37 °C)   Resp 16   Wt 110 lb (49.9 kg)   SpO2 97%   BMI 18.88 kg/m²     Physical Exam  Constitutional:       Comments: cachectic    HENT:      Head: Normocephalic and atraumatic. Right Ear: External ear normal.      Left Ear: External ear normal.      Nose: Nose normal.      Mouth/Throat:      Mouth: Mucous membranes are moist.      Pharynx: Oropharynx is clear. Eyes:      Conjunctiva/sclera: Conjunctivae normal.      Pupils: Pupils are equal, round, and reactive to light.    Cardiovascular: Rate and Rhythm: Normal rate. Pulses: Normal pulses. Pulmonary:      Effort: Pulmonary effort is normal.   Abdominal:      Palpations: Abdomen is soft. Skin:     General: Skin is warm. Capillary Refill: Capillary refill takes 2 to 3 seconds. Neurological:      Mental Status: She is alert. Mental status is at baseline.          Labs      Recent Results (from the past 24 hour(s))   EKG Emergency    Collection Time: 08/18/21  2:21 PM   Result Value Ref Range    Ventricular Rate 75 BPM    Atrial Rate 75 BPM    P-R Interval 168 ms    QRS Duration 146 ms    Q-T Interval 424 ms    QTc Calculation (Bazett) 473 ms    P Axis 41 degrees    R Axis -64 degrees    T Axis 119 degrees   CBC auto differential    Collection Time: 08/18/21  2:37 PM   Result Value Ref Range    WBC 8.3 4.8 - 10.8 thou/mm3    RBC 4.12 (L) 4.20 - 5.40 mill/mm3    Hemoglobin 13.9 12.0 - 16.0 gm/dl    Hematocrit 41.3 37.0 - 47.0 %    .2 (H) 81.0 - 99.0 fL    MCH 33.7 (H) 26.0 - 33.0 pg    MCHC 33.7 32.2 - 35.5 gm/dl    RDW-CV 12.5 11.5 - 14.5 %    RDW-SD 45.7 (H) 35.0 - 45.0 fL    Platelets 136 (L) 585 - 400 thou/mm3    MPV 9.4 9.4 - 12.4 fL    Seg Neutrophils 74.9 %    Lymphocytes 14.0 %    Monocytes 10.3 %    Eosinophils 0.0 %    Basophils 0.1 %    Immature Granulocytes 0.7 %    Segs Absolute 6.2 1 - 7 thou/mm3    Lymphocytes Absolute 1.2 1.0 - 4.8 thou/mm3    Monocytes Absolute 0.9 0.4 - 1.3 thou/mm3    Eosinophils Absolute 0.0 0.0 - 0.4 thou/mm3    Basophils Absolute 0.0 0.0 - 0.1 thou/mm3    Immature Grans (Abs) 0.06 0.00 - 0.07 thou/mm3    nRBC 0 /100 wbc   Basic Metabolic Panel w/ Reflex to MG    Collection Time: 08/18/21  2:37 PM   Result Value Ref Range    Sodium 134 (L) 135 - 145 meq/L    Potassium reflex Magnesium 4.7 3.5 - 5.2 meq/L    Chloride 95 (L) 98 - 111 meq/L    CO2 28 23 - 33 meq/L    Glucose 268 (H) 70 - 108 mg/dL    BUN 18 7 - 22 mg/dL    CREATININE 0.8 0.4 - 1.2 mg/dL    Calcium 9.2 8.5 - 10.5 mg/dL Troponin    Collection Time: 08/18/21  2:37 PM   Result Value Ref Range    Troponin T 0.027 (A) ng/ml   Anion Gap    Collection Time: 08/18/21  2:37 PM   Result Value Ref Range    Anion Gap 11.0 8.0 - 16.0 meq/L   Glomerular Filtration Rate, Estimated    Collection Time: 08/18/21  2:37 PM   Result Value Ref Range    Est, Glom Filt Rate 68 (A) ml/min/1.73m2   Osmolality    Collection Time: 08/18/21  2:37 PM   Result Value Ref Range    Osmolality Calc 279.6 275.0 - 300.0 mOsmol/kg   Urinalysis with microscopic    Collection Time: 08/18/21  4:34 PM   Result Value Ref Range    Glucose, Urine >= 1000 (A) NEGATIVE mg/dl    Bilirubin Urine NEGATIVE NEGATIVE    Ketones, Urine TRACE (A) NEGATIVE    Specific Gravity, UA 1.021 1.002 - 1.030    Blood, Urine SMALL (A) NEGATIVE    pH, UA 6.5 5.0 - 9.0    Protein,  (A) NEGATIVE mg/dl    Urobilinogen, Urine 1.0 0.0 - 1.0 eu/dl    Nitrite, Urine NEGATIVE NEGATIVE    Leukocyte Esterase, Urine LARGE (A) NEGATIVE    Color, UA YELLOW YELLOW-STRAW    Character, Urine TURBID (A) [id number]   Rapid influenza A/B antigens    Collection Time: 08/18/21  4:34 PM    Specimen: Nasopharyngeal   Result Value Ref Range    Flu A Antigen Negative NEGATIVE    Flu B Antigen Negative NEGATIVE   COVID-19, Rapid    Collection Time: 08/18/21  4:34 PM   Result Value Ref Range    SARS-CoV-2, NAAT NOT DETECTED NOT DETECTED   Protime-INR    Collection Time: 08/18/21  5:59 PM   Result Value Ref Range    INR 1.09 0.85 - 1.13   APTT    Collection Time: 08/18/21  5:59 PM   Result Value Ref Range    aPTT 31.6 22.0 - 38.0 seconds        Imaging/Diagnostics Last 24 Hours   XR CHEST (2 VW)    Result Date: 8/18/2021  PROCEDURE: XR CHEST (2 VW) CLINICAL INFORMATION: 49-year-old female shortness of breath. COMPARISON: Radiographs 3/31/2007. TECHNIQUE: AP and lateral views of the chest were obtained. FINDINGS: The lungs are clear. There is cardiomegaly. The pulmonary vasculature is within normal limits.  There is no significant pleural effusion or pneumothorax. Visualized portions of the upper abdomen are within normal limits. The osseous structures are intact. Degenerative changes are seen throughout the spine and the shoulders. No acute fractures or suspicious osseous lesions. There is no acute intrathoracic process. **This report has been created using voice recognition software. It may contain minor errors which are inherent in voice recognition technology. ** Final report electronically signed by Dr Valentino Rivera on 8/18/2021 4:00 PM    CT HEAD WO CONTRAST    Result Date: 8/18/2021  PROCEDURE: CT HEAD WO CONTRAST CLINICAL INFORMATION: 59-year-old female with altered mental status. COMPARISON: Head CT 09/02/2017. TECHNIQUE: Noncontrast 5 mm axial images were obtained through the brain. All CT scans at this facility use dose modulation, iterative reconstruction, and/or weight-based dosing when appropriate to reduce radiation dose to as low as reasonably achievable. FINDINGS: There is prominence of the sulci and gyri consistent with age-related volume loss. There is no hemorrhage. There are no intra-or extra-axial collections. There is no hydrocephalus, midline shift or mass effect. The gray-white matter differentiation is preserved. There is hypoattenuation throughout the white matter consistent with chronic microvascular ischemic disease. The paranasal sinuses and mastoid air cells are normally aerated. There is no suspicious calvarial abnormality. 1. No acute intracranial hemorrhage, mass effect or midline shift. 2. Chronic senescent changes of the brain including generalized atrophy and chronic microvascular ischemic changes in the white matter. **This report has been created using voice recognition software. It may contain minor errors which are inherent in voice recognition technology. ** Final report electronically signed by Dr Valentino Rivera on 8/18/2021 3:45 PM      Assessment / Plan    1.  NSTEMI, denies any chest pain, no EKG changes, mild elevation in troponin   Cont to trend, start on BB and ASA   2. ? UTI, large Leuk Esterase but negative Nitrite, pt is allergic to PCN, will start on Levaquin   3. HTN, mildly elevated, will start on PO home meds   4.  T2DM cont PO Med, monitor blood sugars and Lispro to scale            Consultations Ordered:  None    Electronically signed by Britni Knight MD on 8/18/21 at 6:29 PM EDT

## 2021-08-18 NOTE — ED NOTES
Patient to the ED with complaints of fatigue from nursing home. Per nursing home report patient has been more fatigued than normal. Patient's mental status is at baseline. Patient denies any complaints or pain at this time. Patient resting with easy and unlabored respirations at this time. Call light in reach. Side rails up x2. Patient denies further complaints or concerns. Will monitor.         Dariel Presley RN  08/18/21 [id number]

## 2021-08-19 PROBLEM — R41.82 ALTERED MENTAL STATUS: Status: ACTIVE | Noted: 2021-08-19

## 2021-08-19 PROBLEM — R77.8 ELEVATED TROPONIN LEVEL: Status: ACTIVE | Noted: 2021-08-19

## 2021-08-19 LAB
ALBUMIN SERPL-MCNC: 3.4 G/DL (ref 3.5–5.1)
ALP BLD-CCNC: 64 U/L (ref 38–126)
ALT SERPL-CCNC: 12 U/L (ref 11–66)
ANION GAP SERPL CALCULATED.3IONS-SCNC: 11 MEQ/L (ref 8–16)
APTT: 37.3 SECONDS (ref 22–38)
AST SERPL-CCNC: 19 U/L (ref 5–40)
AVERAGE GLUCOSE: 162 MG/DL (ref 70–126)
BILIRUB SERPL-MCNC: 0.7 MG/DL (ref 0.3–1.2)
BILIRUBIN DIRECT: < 0.2 MG/DL (ref 0–0.3)
BUN BLDV-MCNC: 18 MG/DL (ref 7–22)
CALCIUM SERPL-MCNC: 8.7 MG/DL (ref 8.5–10.5)
CHLORIDE BLD-SCNC: 99 MEQ/L (ref 98–111)
CO2: 24 MEQ/L (ref 23–33)
CREAT SERPL-MCNC: 0.7 MG/DL (ref 0.4–1.2)
EKG ATRIAL RATE: 75 BPM
EKG P AXIS: 41 DEGREES
EKG P-R INTERVAL: 168 MS
EKG Q-T INTERVAL: 424 MS
EKG QRS DURATION: 146 MS
EKG QTC CALCULATION (BAZETT): 473 MS
EKG R AXIS: -64 DEGREES
EKG T AXIS: 119 DEGREES
EKG VENTRICULAR RATE: 75 BPM
ERYTHROCYTE [DISTWIDTH] IN BLOOD BY AUTOMATED COUNT: 12.7 % (ref 11.5–14.5)
ERYTHROCYTE [DISTWIDTH] IN BLOOD BY AUTOMATED COUNT: 47.3 FL (ref 35–45)
GFR SERPL CREATININE-BSD FRML MDRD: 79 ML/MIN/1.73M2
GLUCOSE BLD-MCNC: 168 MG/DL (ref 70–108)
GLUCOSE BLD-MCNC: 196 MG/DL (ref 70–108)
GLUCOSE BLD-MCNC: 224 MG/DL (ref 70–108)
GLUCOSE BLD-MCNC: 256 MG/DL (ref 70–108)
GLUCOSE BLD-MCNC: 292 MG/DL (ref 70–108)
GLUCOSE BLD-MCNC: 97 MG/DL (ref 70–108)
HBA1C MFR BLD: 7.4 % (ref 4.4–6.4)
HCT VFR BLD CALC: 35.4 % (ref 37–47)
HEMOGLOBIN: 12 GM/DL (ref 12–16)
MCH RBC QN AUTO: 33.9 PG (ref 26–33)
MCHC RBC AUTO-ENTMCNC: 33.9 GM/DL (ref 32.2–35.5)
MCV RBC AUTO: 100 FL (ref 81–99)
PLATELET # BLD: 102 THOU/MM3 (ref 130–400)
PMV BLD AUTO: 9.4 FL (ref 9.4–12.4)
POTASSIUM REFLEX MAGNESIUM: 4.2 MEQ/L (ref 3.5–5.2)
RBC # BLD: 3.54 MILL/MM3 (ref 4.2–5.4)
SODIUM BLD-SCNC: 134 MEQ/L (ref 135–145)
TOTAL PROTEIN: 6 G/DL (ref 6.1–8)
TROPONIN T: 0.04 NG/ML
TROPONIN T: 0.05 NG/ML
WBC # BLD: 8 THOU/MM3 (ref 4.8–10.8)

## 2021-08-19 PROCEDURE — 1200000000 HC SEMI PRIVATE

## 2021-08-19 PROCEDURE — G0378 HOSPITAL OBSERVATION PER HR: HCPCS

## 2021-08-19 PROCEDURE — 84484 ASSAY OF TROPONIN QUANT: CPT

## 2021-08-19 PROCEDURE — 93010 ELECTROCARDIOGRAM REPORT: CPT | Performed by: INTERNAL MEDICINE

## 2021-08-19 PROCEDURE — 85730 THROMBOPLASTIN TIME PARTIAL: CPT

## 2021-08-19 PROCEDURE — 6370000000 HC RX 637 (ALT 250 FOR IP): Performed by: STUDENT IN AN ORGANIZED HEALTH CARE EDUCATION/TRAINING PROGRAM

## 2021-08-19 PROCEDURE — 6360000002 HC RX W HCPCS: Performed by: INTERNAL MEDICINE

## 2021-08-19 PROCEDURE — 36415 COLL VENOUS BLD VENIPUNCTURE: CPT

## 2021-08-19 PROCEDURE — 6370000000 HC RX 637 (ALT 250 FOR IP): Performed by: INTERNAL MEDICINE

## 2021-08-19 PROCEDURE — 82948 REAGENT STRIP/BLOOD GLUCOSE: CPT

## 2021-08-19 PROCEDURE — 99232 SBSQ HOSP IP/OBS MODERATE 35: CPT | Performed by: FAMILY MEDICINE

## 2021-08-19 PROCEDURE — 2580000003 HC RX 258: Performed by: INTERNAL MEDICINE

## 2021-08-19 PROCEDURE — 80048 BASIC METABOLIC PNL TOTAL CA: CPT

## 2021-08-19 PROCEDURE — 83036 HEMOGLOBIN GLYCOSYLATED A1C: CPT

## 2021-08-19 PROCEDURE — 85027 COMPLETE CBC AUTOMATED: CPT

## 2021-08-19 PROCEDURE — 96365 THER/PROPH/DIAG IV INF INIT: CPT

## 2021-08-19 PROCEDURE — 96372 THER/PROPH/DIAG INJ SC/IM: CPT

## 2021-08-19 PROCEDURE — 93005 ELECTROCARDIOGRAM TRACING: CPT | Performed by: STUDENT IN AN ORGANIZED HEALTH CARE EDUCATION/TRAINING PROGRAM

## 2021-08-19 PROCEDURE — 80076 HEPATIC FUNCTION PANEL: CPT

## 2021-08-19 RX ORDER — HEPARIN SODIUM 1000 [USP'U]/ML
60 INJECTION, SOLUTION INTRAVENOUS; SUBCUTANEOUS PRN
Status: DISCONTINUED | OUTPATIENT
Start: 2021-08-19 | End: 2021-08-19

## 2021-08-19 RX ORDER — HEPARIN SODIUM 10000 [USP'U]/100ML
5-30 INJECTION, SOLUTION INTRAVENOUS CONTINUOUS
Status: DISCONTINUED | OUTPATIENT
Start: 2021-08-19 | End: 2021-08-19

## 2021-08-19 RX ORDER — FLUCONAZOLE 100 MG/1
150 TABLET ORAL ONCE
Status: COMPLETED | OUTPATIENT
Start: 2021-08-19 | End: 2021-08-19

## 2021-08-19 RX ORDER — METOPROLOL SUCCINATE 25 MG/1
25 TABLET, EXTENDED RELEASE ORAL DAILY
Qty: 30 TABLET | Refills: 3 | Status: CANCELLED | OUTPATIENT
Start: 2021-08-20

## 2021-08-19 RX ORDER — HEPARIN SODIUM 1000 [USP'U]/ML
30 INJECTION, SOLUTION INTRAVENOUS; SUBCUTANEOUS PRN
Status: DISCONTINUED | OUTPATIENT
Start: 2021-08-19 | End: 2021-08-19

## 2021-08-19 RX ORDER — AMLODIPINE BESYLATE 5 MG/1
5 TABLET ORAL DAILY
Qty: 30 TABLET | Refills: 3 | Status: CANCELLED | OUTPATIENT
Start: 2021-08-20

## 2021-08-19 RX ORDER — CLOPIDOGREL BISULFATE 75 MG/1
75 TABLET ORAL DAILY
Status: DISCONTINUED | OUTPATIENT
Start: 2021-08-19 | End: 2021-08-20 | Stop reason: HOSPADM

## 2021-08-19 RX ORDER — METOPROLOL SUCCINATE 25 MG/1
25 TABLET, EXTENDED RELEASE ORAL DAILY
Status: DISCONTINUED | OUTPATIENT
Start: 2021-08-19 | End: 2021-08-20 | Stop reason: HOSPADM

## 2021-08-19 RX ORDER — HEPARIN SODIUM 1000 [USP'U]/ML
60 INJECTION, SOLUTION INTRAVENOUS; SUBCUTANEOUS ONCE
Status: DISCONTINUED | OUTPATIENT
Start: 2021-08-19 | End: 2021-08-19

## 2021-08-19 RX ORDER — LEVOFLOXACIN 500 MG/1
500 TABLET, FILM COATED ORAL DAILY
Qty: 10 TABLET | Refills: 0 | Status: CANCELLED | OUTPATIENT
Start: 2021-08-20 | End: 2021-08-30

## 2021-08-19 RX ORDER — LEVOFLOXACIN 500 MG/1
500 TABLET, FILM COATED ORAL DAILY
Status: DISCONTINUED | OUTPATIENT
Start: 2021-08-20 | End: 2021-08-20 | Stop reason: HOSPADM

## 2021-08-19 RX ADMIN — INSULIN LISPRO 1 UNITS: 100 INJECTION, SOLUTION INTRAVENOUS; SUBCUTANEOUS at 02:13

## 2021-08-19 RX ADMIN — DIVALPROEX SODIUM 125 MG: 125 CAPSULE ORAL at 20:26

## 2021-08-19 RX ADMIN — LEVOFLOXACIN 500 MG: 5 INJECTION, SOLUTION INTRAVENOUS at 01:51

## 2021-08-19 RX ADMIN — LISINOPRIL 20 MG: 20 TABLET ORAL at 08:49

## 2021-08-19 RX ADMIN — FLUCONAZOLE 150 MG: 100 TABLET ORAL at 11:57

## 2021-08-19 RX ADMIN — GLIPIZIDE 5 MG: 5 TABLET ORAL at 08:49

## 2021-08-19 RX ADMIN — INSULIN LISPRO 2 UNITS: 100 INJECTION, SOLUTION INTRAVENOUS; SUBCUTANEOUS at 20:31

## 2021-08-19 RX ADMIN — AMLODIPINE BESYLATE 5 MG: 5 TABLET ORAL at 08:49

## 2021-08-19 RX ADMIN — ENOXAPARIN SODIUM 40 MG: 40 INJECTION SUBCUTANEOUS at 08:49

## 2021-08-19 RX ADMIN — ACETAMINOPHEN 650 MG: 325 TABLET ORAL at 16:28

## 2021-08-19 RX ADMIN — LEVOTHYROXINE SODIUM 50 MCG: 0.05 TABLET ORAL at 08:49

## 2021-08-19 RX ADMIN — INSULIN LISPRO 1 UNITS: 100 INJECTION, SOLUTION INTRAVENOUS; SUBCUTANEOUS at 08:50

## 2021-08-19 RX ADMIN — METOPROLOL SUCCINATE 25 MG: 25 TABLET, FILM COATED, EXTENDED RELEASE ORAL at 08:49

## 2021-08-19 RX ADMIN — LISINOPRIL 20 MG: 20 TABLET ORAL at 02:13

## 2021-08-19 RX ADMIN — SODIUM CHLORIDE, PRESERVATIVE FREE 10 ML: 5 INJECTION INTRAVENOUS at 09:02

## 2021-08-19 RX ADMIN — SODIUM CHLORIDE, PRESERVATIVE FREE 10 ML: 5 INJECTION INTRAVENOUS at 02:16

## 2021-08-19 RX ADMIN — ASPIRIN 324 MG: 81 TABLET, CHEWABLE ORAL at 02:18

## 2021-08-19 RX ADMIN — SODIUM CHLORIDE, PRESERVATIVE FREE 10 ML: 5 INJECTION INTRAVENOUS at 20:27

## 2021-08-19 RX ADMIN — INSULIN LISPRO 3 UNITS: 100 INJECTION, SOLUTION INTRAVENOUS; SUBCUTANEOUS at 11:56

## 2021-08-19 RX ADMIN — LISINOPRIL 20 MG: 20 TABLET ORAL at 16:31

## 2021-08-19 ASSESSMENT — PAIN SCALES - GENERAL
PAINLEVEL_OUTOF10: 0
PAINLEVEL_OUTOF10: 0

## 2021-08-19 NOTE — PROGRESS NOTES
Report called to Julius Collins on 5K. 1805-patient transported to [facility] with personal belongings and medications.

## 2021-08-19 NOTE — CARE COORDINATION
8/19/21, 7:58 AM EDT  DISCHARGE PLANNING EVALUATION:    Augustina Manuel       Admitted: 8/18/2021/ Henny day: 1   Location: [address] [place] [address]- Reason for admit: Elevated troponin level [R77.8]  NSTEM[facility] (non-[facility] elevated [facility] [I21.4]  Altered mental status, unspecified altered mental status type [R41.82]   PMH:  has a past medical history of Arthritis, Diabetes mellitus ([facility] Hypertension, and Thyroid disease. Procedure: None  Barriers to Discharge:  Admitted through ED with fatigue and found staring and drooling at her home for 5 minutes. Pt is a DNR-CC. Troponins 0.027, 0.031 and 0.040. UA trace ketones, small blood, 100 protein, large leukocytes, >= 1000 glucose, > 200 WBC, many bacteria, many budding yeast, turbid in nature. Started Levaquin iv daily. Lovenox. Humalog, Synthroid, Lisinopril, Toprol XL, Depakote nightly. Switching Levaquin to po today. PCP: Jaclyn Peña MD  Readmission Risk Score: 11%    Patient Goals/Plan/Treatment Preferences: Pt is from [facility]. Family plans for pt to return there at discharge. SW consulted. Transportation/Food Security/Housekeeping Addressed:  No issues identified.

## 2021-08-19 NOTE — PROGRESS NOTES
PROGRESS NOTE      Patient:  Sixto Turner      Unit/Bed:3B-26/026-A    YOB: 1934    MRN: [id number]       Acct: [de-identified]     PCP: Adele Solis MD    Date of Admission: 8/18/2021      Assessment/Plan:    Anticipated Discharge in : 1 day    C/Logan Manjarrez 1106 Problems    Diagnosis Date Noted    NSTEMI (non-ST elevated myocardial infarction) ([facility] [address] [I21.4] 08/18/2021       1. Type 2 NSTEMI - Denied chest pain, but troponin has slightly increased from 0.027 to 0.04 overnight. No EKG changes. Patient is DNR-CC and would not benefit from cardiology procedure. Spoke with cardiology who agreed with these goals of care. Likely from metabolic causes as she did spike fever today. Admission team started metoprolol and aspirin. 2. Urinary Tract Infection - Poor historian but was altered per outside facility. UA did show large leuk and > 200 WBC. Also showed many budding yeast.  Given difulucan x1 and levaquin for 5 days. On PO currently. Did spike fever of 101 on 8/19  3. Altered Mental Status, resolved - Reportedly per Outside facility. Had staring spell. Since admission has been awake and interactive. CT did not show acute process, but did show chronic changes, likely related to dementia. Possibly also from UTI. Will treat and continue to reassess. Spoke with family who stated she appears at baseline. 4. Essential Hypertension - Did have brief episode of borderline hypotension to 99/53, but asymptomatic. Improved to 135/64 without intevention. Was started on troprol 50mg BID at admission, decreased to 25mg BID. May have had this prior to admission, but was not taking, reportedly. Continued home lisinopril 200mg and amlodipine 5mg as patient was hypertensive at arrival to 160s. Placed holding parameters  5. T2DM - Controlled on SSI and home oral hypoglycemics. [id number] since admission. No know A1C. Will recheck and double metformin to 1000mg if > 8  6.  Insomnia - Continued on home Depakote 125mg, slept well  7. Hypothyroidism - Continue home synthroid 50mcg  8. Placement - Appears to be medically stable for discharge currently, but will speak with the [address] and family and reassess. Update, will need precert for higher level of care. Beginning      Chief Complaint: Altered mental status    Hospital Course:     Per HPI  \"Gissel Ham is a 80 y.o. female who presents to the emergency department for evaluation of her mental status.  Per report patient was at nursing home today and appeared to be staring into space and drooling for approximately 5 to 10 minutes.  When spoken to she would not respond.  Per report of nursing home patient has been more confused than usual today. Rekha  denies any pain or discomfort.  Patient denies any chest pain, shortness of breath, or syncope. The patient has no other acute complaints at this time. \"    Since admission, had troponins trended which did slightly increase (.027 - 0.41), but patient denied symptoms. Is DNR-CC and cardiology agreed patient would not benefit from cardiology procedure based on goals of care. Did have UTI noted. Given diflucanx1 and started on levaquin for 5 days, final results pending    Subjective:    No acute events overnight. Patient says she is \"okay\" and about at baseline. Denied any headache, chest pain, shortness of air, abdominal pain or changes to bowel habits. Was awake and alert today, knew location and name.         Medications:  Reviewed    Infusion Medications    sodium chloride      dextrose       Scheduled Medications    amLODIPine  5 mg Oral Daily    divalproex  125 mg Oral Nightly    levothyroxine  50 mcg Oral Daily    lisinopril  20 mg Oral BID    metoprolol succinate  50 mg Oral Daily    glipiZIDE  5 mg Oral QAM AC    sodium chloride flush  10 mL Intravenous 2 times per day    enoxaparin  40 mg Subcutaneous Daily    levofloxacin  500 mg Intravenous Q24H    insulin lispro  0-6 Units Subcutaneous TID     insulin lispro  0-3 Units Subcutaneous Nightly     PRN Meds: sodium chloride flush, sodium chloride, ondansetron **OR** ondansetron, polyethylene glycol, acetaminophen **OR** acetaminophen, glucose, dextrose, glucagon (rDNA), dextrose      Intake/Output Summary (Last 24 hours) at 8/19/2021 8439  Last data filed at 8/19/2021 0315  Gross per 24 hour   Intake 60 ml   Output 0 ml   Net 60 ml       Diet:  ADULT DIET; Regular; Low Sodium (2 gm)    Exam:  BP (!) 99/53   Pulse 64   Temp 98.3 °F (36.8 °C) (Oral)   Resp 16   Wt 111 lb 9.6 oz (50.6 kg)   SpO2 97%   BMI 19.16 kg/m²     Physical Exam  Vitals and nursing note reviewed. Constitutional:       General: She is not in acute distress. HENT:      Head: Normocephalic and atraumatic. Nose: Nose normal.      Mouth/Throat:      Mouth: Mucous membranes are moist.      Pharynx: Oropharynx is clear. Eyes:      Extraocular Movements: Extraocular movements intact. Pupils: Pupils are equal, round, and reactive to light. Cardiovascular:      Rate and Rhythm: Normal rate and regular rhythm. Pulses: Normal pulses. Heart sounds: Normal heart sounds. Pulmonary:      Effort: Pulmonary effort is normal.      Breath sounds: Normal breath sounds. Abdominal:      Palpations: Abdomen is soft. Tenderness: There is no abdominal tenderness. Musculoskeletal:         General: No signs of injury. Normal range of motion. Cervical back: Normal range of motion and neck supple. Skin:     General: Skin is warm and dry. Capillary Refill: Capillary refill takes less than 2 seconds. Neurological:      General: No focal deficit present. Mental Status: She is alert. Mental status is at baseline. Cranial Nerves: No cranial nerve deficit.    Psychiatric:         Mood and Affect: Mood normal.         Behavior: Behavior normal.         Labs:   Recent Labs     08/18/21  1437 08/19/21  0409   WBC 8.3 8.0   HGB 13.9 12.0   HCT 41.3 35.4*   * 102*     Recent Labs     08/18/21  1437 08/19/21  0409   * 134*   K 4.7 4.2   CL 95* 99   CO2 28 24   BUN 18 18   CREATININE 0.8 0.7   CALCIUM 9.2 8.7     Recent Labs     08/19/21  0409   AST 19   ALT 12   BILIDIR <0.2   BILITOT 0.7   ALKPHOS 64     Recent Labs     08/18/21  1759   INR 1.09     No results for input(s): Brenda Lager in the last 72 hours. Urinalysis:      Lab Results   Component Value Date    NITRU NEGATIVE 08/18/2021    WBCUA > 200 08/18/2021    BACTERIA MANY 08/18/2021    RBCUA 0-2 08/18/2021    BLOODU SMALL 08/18/2021    SPECGRAV 1.021 08/18/2021    GLUCOSEU 250 09/02/2017       Radiology:  XR CHEST (2 VW)   Final Result   There is no acute intrathoracic process. **This report has been created using voice recognition software. It may contain minor errors which are inherent in voice recognition technology. **      Final report electronically signed by Dr Jay Bustillo on 8/18/2021 4:00 PM      CT HEAD WO CONTRAST   Final Result      1. No acute intracranial hemorrhage, mass effect or midline shift. 2. Chronic senescent changes of the brain including generalized atrophy and chronic microvascular ischemic changes in the white matter. **This report has been created using voice recognition software. It may contain minor errors which are inherent in voice recognition technology. **      Final report electronically signed by Dr Jay Bustillo on 8/18/2021 3:45 PM          Diet: ADULT DIET; Regular;  Low Sodium (2 gm)    DVT prophylaxis: [x] Lovenox                                 [] SCDs                                 [] SQ Heparin                                 [] Encourage ambulation           [] Already on Anticoagulation     Disposition:    [x] Home       [] TCU       [] Rehab       [] Psych       [] SNF       [] [place]       [] Other-    Code Status: DNR-CC    PT/OT Eval Status: Ongoing      Electronically signed by Laina Estrada MD on 8/19/2021 at 7:02 AM

## 2021-08-19 NOTE — PROGRESS NOTES
Pt admitted came from the nursing home and was admitted from ER. Jean Grider Awake, alert and oriented to name only. Perianal erythema noted, pericare done and lotion applied. Skin assessment noted by this RN and Rikki Wolf RN. Elevated troponin x3. She denied chest pain Pt. has DNR with comfort care record from the nursing, which was also confirmed by her daughter, Shanel Hanson. . Her daughter was at the bedside during admission and answered most questions. She called this morning and was updated on her mother's condition. She takes medications whole with applesause and no acute distress noted.

## 2021-08-19 NOTE — PROGRESS NOTES
Pt admitted to  5K8 by cart/stretcher from 3A/B. IV site free of s/s of infection or infiltration. Vital signs obtained. Assessment complete. Oriented to room. All questions answered with no further questions at this time. Two nurse skin assessment performed by Norbert Salmon and Brown Monterroso. Oriented to room. Policies and procedures for  explained. A Fall prevention and safety brochure discussed with patient. Bed alarm on. Call light in reach.

## 2021-08-19 NOTE — CARE COORDINATION
DISCHARGE/PLANNING EVALUATION  8/19/21, 11:11 AM EDT    Reason for Referral: From [facility]. Mental Status: Confused. Was unable to follow SW conversation. She did not speak even when spoken to, except to say good bye when SW left the room. Decision Making: Daughter Traci Lu is the POA and she makes all the decisions. Family/Social/Home Environment: From [facility] assisted living. They make sure her needs are met. Current Services including food security, transportation and housekeeping: From [facility] [address] Current Equipment:rolling walker, wheelchair, high toilet, bench in shower  Payment Source:[employer]  Concerns or Barriers to Discharge: None  Post acute provider list with quality measures, geographic area and applicable managed care information provided. Questions regarding selection process answered:ECF list will be given to family. Teach Back Method used with Gissel's son Aron Razo and daughter Traci Lu regarding care plan and discharge planning. Patient's family  verbalized understanding of the plan of care and contribute to goal setting. Patient goals, treatment preferences and discharge plan: Met with family and they told SW that they would like the patient to return to [facility] at discharge. Asked them if the facility has discussed any concerns with Shaina Barriga stayjade at their facility. They told SW that no one has ever brought any concerns to them. Called and spoke with Rema Castañeda at [address]. SW asked if they would be able to meet needs at discharge. After some discussion Rema Castañeda told SW that they will not be able to accept her back because she is more care then they can handle. SW asked Rema Castañeda that they make family aware she can't come back. Rema Castañeda agreed and will call SW once they talk to family. Will meet with them after that to discuss skilled nursing home placement.       Electronically signed by Carolyn Leyva, ARIN on 8/19/2021 at 11:11 AM     Update 2:12pm: Spoke with patients son Lisbet Fields and daughter Elroy Clay. Made them aware The [address] will not accept back at discharge as she is more care then they are able to handle. Their first choice of nursing facilities would be [facility]. Made referral to the facility. Spoke with Patel Aldana at [facility] and they are able to accept tomorrow. She will be private pay at the facility. Updated Nick Franco RN. Update 3:24pm: Spoke with son and daughter and they are agreeable to potential discharge tomorrow to [facility]. They are agreeable to ambulance transport.

## 2021-08-20 VITALS
WEIGHT: 111.6 LBS | DIASTOLIC BLOOD PRESSURE: 68 MMHG | BODY MASS INDEX: 19.16 KG/M2 | OXYGEN SATURATION: 97 % | SYSTOLIC BLOOD PRESSURE: 168 MMHG | HEART RATE: 76 BPM | TEMPERATURE: 98.6 F | RESPIRATION RATE: 16 BRPM

## 2021-08-20 PROBLEM — I21.A1 TYPE 2 MI (MYOCARDIAL INFARCTION) (HCC): Status: ACTIVE | Noted: 2021-08-18

## 2021-08-20 PROBLEM — I21.4 NSTEMI (NON-ST ELEVATED MYOCARDIAL INFARCTION) (HCC): Status: RESOLVED | Noted: 2021-08-18 | Resolved: 2021-08-20

## 2021-08-20 LAB
ANION GAP SERPL CALCULATED.3IONS-SCNC: 8 MEQ/L (ref 8–16)
BASOPHILS # BLD: 0.2 %
BASOPHILS ABSOLUTE: 0 THOU/MM3 (ref 0–0.1)
BUN BLDV-MCNC: 22 MG/DL (ref 7–22)
CALCIUM SERPL-MCNC: 8.6 MG/DL (ref 8.5–10.5)
CHLORIDE BLD-SCNC: 97 MEQ/L (ref 98–111)
CO2: 28 MEQ/L (ref 23–33)
CREAT SERPL-MCNC: 0.9 MG/DL (ref 0.4–1.2)
EKG ATRIAL RATE: 77 BPM
EKG P AXIS: 42 DEGREES
EKG P-R INTERVAL: 170 MS
EKG Q-T INTERVAL: 436 MS
EKG QRS DURATION: 150 MS
EKG QTC CALCULATION (BAZETT): 493 MS
EKG R AXIS: -51 DEGREES
EKG T AXIS: 141 DEGREES
EKG VENTRICULAR RATE: 77 BPM
EOSINOPHIL # BLD: 0 %
EOSINOPHILS ABSOLUTE: 0 THOU/MM3 (ref 0–0.4)
ERYTHROCYTE [DISTWIDTH] IN BLOOD BY AUTOMATED COUNT: 12.7 % (ref 11.5–14.5)
ERYTHROCYTE [DISTWIDTH] IN BLOOD BY AUTOMATED COUNT: 47.1 FL (ref 35–45)
GFR SERPL CREATININE-BSD FRML MDRD: 59 ML/MIN/1.73M2
GLUCOSE BLD-MCNC: 102 MG/DL (ref 70–108)
GLUCOSE BLD-MCNC: 191 MG/DL (ref 70–108)
GLUCOSE BLD-MCNC: 96 MG/DL (ref 70–108)
HCT VFR BLD CALC: 33.6 % (ref 37–47)
HEMOGLOBIN: 11.5 GM/DL (ref 12–16)
IMMATURE GRANS (ABS): 0.02 THOU/MM3 (ref 0–0.07)
IMMATURE GRANULOCYTES: 0.3 %
LYMPHOCYTES # BLD: 25.3 %
LYMPHOCYTES ABSOLUTE: 1.5 THOU/MM3 (ref 1–4.8)
MCH RBC QN AUTO: 34.6 PG (ref 26–33)
MCHC RBC AUTO-ENTMCNC: 34.2 GM/DL (ref 32.2–35.5)
MCV RBC AUTO: 101.2 FL (ref 81–99)
MONOCYTES # BLD: 12 %
MONOCYTES ABSOLUTE: 0.7 THOU/MM3 (ref 0.4–1.3)
NUCLEATED RED BLOOD CELLS: 0 /100 WBC
ORGANISM: ABNORMAL
PLATELET # BLD: 97 THOU/MM3 (ref 130–400)
PMV BLD AUTO: 9.7 FL (ref 9.4–12.4)
POTASSIUM REFLEX MAGNESIUM: 4.1 MEQ/L (ref 3.5–5.2)
RBC # BLD: 3.32 MILL/MM3 (ref 4.2–5.4)
SEG NEUTROPHILS: 62.2 %
SEGMENTED NEUTROPHILS ABSOLUTE COUNT: 3.6 THOU/MM3 (ref 1.8–7.7)
SODIUM BLD-SCNC: 133 MEQ/L (ref 135–145)
URINE CULTURE, ROUTINE: ABNORMAL
WBC # BLD: 5.8 THOU/MM3 (ref 4.8–10.8)

## 2021-08-20 PROCEDURE — 6370000000 HC RX 637 (ALT 250 FOR IP): Performed by: INTERNAL MEDICINE

## 2021-08-20 PROCEDURE — 6370000000 HC RX 637 (ALT 250 FOR IP): Performed by: STUDENT IN AN ORGANIZED HEALTH CARE EDUCATION/TRAINING PROGRAM

## 2021-08-20 PROCEDURE — 97166 OT EVAL MOD COMPLEX 45 MIN: CPT

## 2021-08-20 PROCEDURE — 2580000003 HC RX 258: Performed by: INTERNAL MEDICINE

## 2021-08-20 PROCEDURE — 85025 COMPLETE CBC W/AUTO DIFF WBC: CPT

## 2021-08-20 PROCEDURE — G0378 HOSPITAL OBSERVATION PER HR: HCPCS

## 2021-08-20 PROCEDURE — 97530 THERAPEUTIC ACTIVITIES: CPT

## 2021-08-20 PROCEDURE — 99239 HOSP IP/OBS DSCHRG MGMT >30: CPT | Performed by: FAMILY MEDICINE

## 2021-08-20 PROCEDURE — 80048 BASIC METABOLIC PNL TOTAL CA: CPT

## 2021-08-20 PROCEDURE — 82948 REAGENT STRIP/BLOOD GLUCOSE: CPT

## 2021-08-20 PROCEDURE — 36415 COLL VENOUS BLD VENIPUNCTURE: CPT

## 2021-08-20 PROCEDURE — 93010 ELECTROCARDIOGRAM REPORT: CPT | Performed by: INTERNAL MEDICINE

## 2021-08-20 RX ORDER — LEVOFLOXACIN 500 MG/1
500 TABLET, FILM COATED ORAL DAILY
Qty: 3 TABLET | Refills: 0 | Status: CANCELLED | DISCHARGE
Start: 2021-08-21 | End: 2021-08-24

## 2021-08-20 RX ORDER — METOPROLOL SUCCINATE 25 MG/1
25 TABLET, EXTENDED RELEASE ORAL DAILY
Qty: 30 TABLET | Refills: 3 | DISCHARGE
Start: 2021-08-21

## 2021-08-20 RX ORDER — LEVOTHYROXINE SODIUM 0.05 MG/1
50 TABLET ORAL DAILY
Qty: 30 TABLET | Refills: 3 | DISCHARGE
Start: 2021-08-21

## 2021-08-20 RX ORDER — LISINOPRIL 20 MG/1
20 TABLET ORAL 2 TIMES DAILY
Qty: 30 TABLET | Refills: 3 | DISCHARGE
Start: 2021-08-20

## 2021-08-20 RX ORDER — GLIPIZIDE 5 MG/1
5 TABLET ORAL
Qty: 60 TABLET | Refills: 3 | DISCHARGE
Start: 2021-08-21

## 2021-08-20 RX ORDER — AMLODIPINE BESYLATE 5 MG/1
5 TABLET ORAL DAILY
Qty: 30 TABLET | Refills: 3 | DISCHARGE
Start: 2021-08-21

## 2021-08-20 RX ADMIN — LISINOPRIL 20 MG: 20 TABLET ORAL at 09:02

## 2021-08-20 RX ADMIN — LEVOFLOXACIN 500 MG: 500 TABLET, FILM COATED ORAL at 09:02

## 2021-08-20 RX ADMIN — CLOPIDOGREL BISULFATE 75 MG: 75 TABLET ORAL at 09:17

## 2021-08-20 RX ADMIN — GLIPIZIDE 5 MG: 5 TABLET ORAL at 05:32

## 2021-08-20 RX ADMIN — METOPROLOL SUCCINATE 25 MG: 25 TABLET, FILM COATED, EXTENDED RELEASE ORAL at 09:03

## 2021-08-20 RX ADMIN — SODIUM CHLORIDE, PRESERVATIVE FREE 10 ML: 5 INJECTION INTRAVENOUS at 09:17

## 2021-08-20 RX ADMIN — LISINOPRIL 20 MG: 20 TABLET ORAL at 17:46

## 2021-08-20 RX ADMIN — AMLODIPINE BESYLATE 5 MG: 5 TABLET ORAL at 09:02

## 2021-08-20 RX ADMIN — LEVOTHYROXINE SODIUM 50 MCG: 0.05 TABLET ORAL at 05:32

## 2021-08-20 RX ADMIN — INSULIN LISPRO 1 UNITS: 100 INJECTION, SOLUTION INTRAVENOUS; SUBCUTANEOUS at 12:46

## 2021-08-20 ASSESSMENT — PAIN SCALES - GENERAL
PAINLEVEL_OUTOF10: 0
PAINLEVEL_OUTOF10: 0

## 2021-08-20 NOTE — PLAN OF CARE
Problem: Skin Integrity:  Goal: Will show no infection signs and symptoms  Description: Will show no infection signs and symptoms  Outcome: Ongoing  Note: Patient turned and repositioned every 2 hours. Heels elevated off of bed. Skin kept clean and dry. Skin assessed with every head to toe. Cirilo scale complete. Will continue to monitor. Goal: Absence of new skin breakdown  Description: Absence of new skin breakdown  Outcome: Ongoing  Note: Patient turned and repositioned every 2 hours. Heels elevated off of bed. Skin kept clean and dry. Skin assessed with every head to toe. Cirilo scale complete. Will continue to monitor. Problem: Falls - Risk of:  Goal: Will remain free from falls  Description: Will remain free from falls  Outcome: Ongoing  Note: Patient bed in lowest position, wheels locked, 2/4 side rails up and alarm on. Call light and belongings within reach. Pathway clear. Nonskid footwear on. Patient rounded on hourly. Fall risk assessment complete. Patient remains free from falls this shift, will continue to monitor. Goal: Absence of physical injury  Description: Absence of physical injury  Outcome: Ongoing  Note: Patient bed in lowest position, wheels locked, 2/4 side rails up and alarm on. Call light and belongings within reach. Pathway clear. Nonskid footwear on. Patient rounded on hourly. Fall risk assessment complete. Patient remains free from falls this shift, will continue to monitor. Problem: DISCHARGE BARRIERS  Goal: Patient's continuum of care needs are met  Outcome: Ongoing  Note: Patient is from [facility]. Discharging to [facility]. Problem: Pain:  Description: Pain management should include both nonpharmacologic and pharmacologic interventions. Goal: Pain level will decrease  Description: Pain level will decrease  Outcome: Ongoing  Note: Patient denies pain this shift. PRN Tylenol available. Patient utilizes rest and repositioning for comfort.  Pain assessment ongoing. Goal: Control of acute pain  Description: Control of acute pain  Outcome: Ongoing  Note: Patient denies pain this shift. PRN Tylenol available. Patient utilizes rest and repositioning for comfort. Pain assessment ongoing. Goal: Control of chronic pain  Description: Control of chronic pain  Outcome: Ongoing  Note: Patient denies pain this shift. PRN Tylenol available. Patient utilizes rest and repositioning for comfort. Pain assessment ongoing. Care plan reviewed with patient and RN. Patient and RN verbalize understanding of the plan of care and contribute to goal setting.

## 2021-08-20 NOTE — CARE COORDINATION
8/20/21, 11:21 AM EDT    Patient goals/plan/ treatment preferences discussed by  and . Patient goals/plan/ treatment preferences reviewed with patient/ family. Patient/ family verbalize understanding of discharge plan and are in agreement with goal/plan/treatment preferences. Understanding was demonstrated using the teach back method. AVS provided by RN at time of discharge, which includes all necessary medical information pertaining to the patients current course of illness, treatment, post-discharge goals of care, and treatment preferences. Services After Discharge  Services At/After Discharge: In ambulance, Nursing Services, Aide Services [facility])   IMM Letter  IMM Letter given to Patient/Family/Significant other/Guardian/POA/by[de-identified] Pt Access  IMM Letter date given[de-identified] 08/18/21  IMM Letter time given[de-identified] [address]       Patient discharged to [facility], private pay bed. Marlin at [facility]/HCF notified of discharge and 5:00 transport time. Will fax AVS and MAR when completed, RN aware to call report. Spoke with  Daughter informed of discharge and 5:00pm transport.

## 2021-08-20 NOTE — DISCHARGE INSTR - COC
Continuity of Care Form    Patient Name: Ema Gilliam   :  1934  MRN:  [id number]    Admit date:  2021  Discharge date:  21    Code Status Order: DNR-CC   Advance Directives:      Admitting Physician:  Janneth Alberts MD  PCP: Chente Redmond MD    Discharging Nurse: [facility] Unit/Room#: 5K-08/008-A  Discharging Unit Phone Number: [phone]    Emergency Contact:   Extended Emergency Contact Information  Primary Emergency Contact: [address] Phone: [phone]  Mobile Phone: [phone]  Relation: Child  Hearing or visual needs: None  Other needs: None  Preferred language: English   needed? No  Secondary Emergency Contact: Catrachita Toledo [address] Phone: [phone]  Mobile Phone: [phone]  Relation: Child  Hearing or visual needs: None  Other needs: None  Preferred language: English   needed? No    Past Surgical History:  Past Surgical History:   Procedure Laterality Date    COLONOSCOPY      HYSTERECTOMY  mid ? ?    partial       Immunization History:   Immunization History   Administered Date(s) Administered    COVID-19, Moderna, PF, 100mcg/0.5mL 2021, 2021    Pneumococcal Conjugate 13-valent Arjun Sanchez) 2017       Active Problems:  Patient Active Problem List   Diagnosis Code    Multiple falls R29.6    Acute cystitis N30.00    Pain and swelling of ankle M25.579, M25.473    Dementia (HCC) F03.90    Hypothyroidism E03.9    Type 2 diabetes mellitus without complication ([facility]) E34.7    NSTEMI (non-ST elevated myocardial infarction) ([facility] [address] I21.4    Elevated troponin level R77.8    Altered mental status R41.82    Hyperglycemia R73.9       Isolation/Infection:   Isolation            No Isolation          Patient Infection Status       Infection Onset Added Last Indicated Last Indicated By Review Planned Expiration Resolved Resolved By    None active    Resolved COVID-19 Rule Out 08/18/21 08/18/21 08/18/21 COVID-19, Rapid (Ordered)   08/18/21 Rule-Out Test Resulted            Nurse Assessment:  Last Vital Signs: BP (!) 112/56   Pulse 58   Temp 98.4 °F (36.9 °C) (Oral)   Resp 18   Wt 111 lb 9.6 oz (50.6 kg)   SpO2 98%   BMI 19.16 kg/m²     Last documented pain score (0-10 scale): Pain Level: 0  Last Weight:   Wt Readings from Last 1 Encounters:   08/19/21 111 lb 9.6 oz (50.6 kg)     Mental Status:  disoriented, oriented, and alert    IV Access:  - None    Nursing Mobility/ADLs:  Walking   Assisted  Transfer  Assisted  Bathing  Assisted  Dressing  Assisted  Toileting  Assisted  Feeding  Independent  Med Admin  Assisted  Med Delivery   whole    Wound Care Documentation and Therapy:  Pressure Ulcer 09/03/17 Coccyx Mid;Upper unblanchable (Active)   Number of days: 4408       Wound 09/03/17 Abrasion(s) Face Right bruise/open abrasion on right cheek (Active)   Number of days: 1447       Wound 08/19/21 Coccyx Mid Stage 1; Coccyx (Active)   Wound Etiology Pressure Stage  1 08/19/21 2005   Dressing/Treatment Barrier film 08/19/21 2005   Wound Assessment Non-blanchable erythema 08/19/21 2005   Drainage Amount None 08/19/21 2005   Number of days: 0        Elimination:  Continence: Bowel: No  Bladder: No  Urinary Catheter: None   Colostomy/Ileostomy/Ileal Conduit: No       Date of Last BM: prior to admission date 8/18/21    Intake/Output Summary (Last 24 hours) at 8/20/2021 0722  Last data filed at 8/20/2021 0434  Gross per 24 hour   Intake 280 ml   Output 0 ml   Net 280 ml     I/O last 3 completed shifts: In: 26 [P.O.:280]  Out: 0     Safety Concerns:      At Risk for Falls    Impairments/Disabilities:      None    Nutrition Therapy:  Current Nutrition Therapy:   - Oral Diet:  General and Low Sodium (2gm)    Routes of Feeding: Oral  Liquids: No Restrictions  Daily Fluid Restriction: no  Last Modified Barium Swallow with Video (Video Swallowing Test): not done    Treatments at the Time of Hospital Discharge:   Respiratory Treatments: see MAR  Oxygen Therapy:  is not on home oxygen therapy. Ventilator:    - No ventilator support    Rehab Therapies: Physical Therapy and Occupational Therapy  Weight Bearing Status/Restrictions: No weight bearing restirctions  Other Medical Equipment (for information only, NOT a DME order): Other Treatments:     Patient's personal belongings (please select all that are sent with patient):  {[facility] DME Belongings:[id number]}    RN SIGNATURE:  Electronically signed by Silvia Moise RN on 8/20/21 at 5:12 PM EDT    CASE MANAGEMENT/SOCIAL WORK SECTION    Inpatient Status Date: 8/18/2021    Readmission Risk Assessment Score:  Readmission Risk              Risk of Unplanned Readmission:  11           Discharging to Facility/ Agency   Name: [address]  Fax:[phone]    Dialysis Facility (if applicable)   Name:  Address:  Dialysis Schedule:  Phone:  Fax:    / signature: Electronically signed by Emma Lambert. ARIN Leyva on 8/20/21 at 7:23 AM EDT    PHYSICIAN SECTION    Prognosis: Good    Condition at Discharge: Stable    Rehab Potential (if transferring to Rehab): Good    Recommended Labs or Other Treatments After Discharge:     Physician Certification: I certify the above information and transfer of Lay Pantoja  is necessary for the continuing treatment of the diagnosis listed and that she requires [facility] for greater 30 days.      Update Admission H&P: No change in H&P    PHYSICIAN SIGNATURE:  Electronically signed by Melani Black MD on 8/20/21 at 5:15 PM EDT

## 2021-08-20 NOTE — PROGRESS NOTES
James Mendiola called to give report James Mendiola nurse not ready to take report was in the middle of something this RN asked for a return call number provided.

## 2021-08-20 NOTE — PROGRESS NOTES
Report called to nurse Mikey Taveras at [facility]. All questions answered.

## 2021-08-20 NOTE — PROGRESS NOTES
Nurse from [facility] called writer to notify faxed papers did not come through.  Attempt to give report to [facility] nurse she stated \"I wont be here when the patient arrives, please call back around 7:15 pm.\"

## 2021-08-20 NOTE — PROGRESS NOTES
Bryant Mendez 60  INPATIENT OCCUPATIONAL THERAPY  [facility] ONC MED 5K  EVALUATION    Time:    Time In: 391  Time Out: 0673  Timed Code Treatment Minutes: 10 Minutes  Minutes: 21          Date: 2021  Patient Name: Angelica Baker,   Gender: female      MRN: [id number]  : 1934  (80 y.o.)  Referring Practitioner: Leighann Hadley MD  Diagnosis: Elevated troponin level  Additional Pertinent Hx: Angelica Baker is a 80 y.o. female who presents to the emergency department for evaluation of her mental status. Per report patient was at nursing home today and appeared to be staring into space and drooling for approximately 5 to 10 minutes. When spoken to she would not respond. Per report of nursing home patient has been more confused than usual today. Patient denies any pain or discomfort. Patient denies any chest pain, shortness of breath, or syncope. The patient has no other acute complaints at this time. Restrictions/Precautions:  Restrictions/Precautions: General Precautions, Fall Risk  Position Activity Restriction  Other position/activity restrictions: bilateral AFOs    Subjective  Chart Reviewed: Yes, Orders, Progress Notes  Patient assessed for rehabilitation services?: Yes  Family / Caregiver Present: No    Subjective: RN okayed session. Pt was resting in bed upon arrival, pleasant and cooperative.     Pain:  Pain Assessment  Patient Currently in Pain: Denies    Vitals: Vitals not assessed per clinical judgement, see nursing flowsheet    Social/Functional History:  Type of Home: Assisted living  Home Layout: One level  Home Access: Level entry  Home Equipment: Rolling walker   Bathroom Shower/Tub: Shower chair with back  Bathroom Toilet: Handicap height  Bathroom Equipment: Grab bars in shower       ADL Assistance: Needs assistance  Homemaking Assistance: Needs assistance  Ambulation Assistance: Independent  Transfer Assistance: Independent         VISION:Corrected    HEARING: Solomon    COGNITION: Slow Processing, Decreased Insight, Decreased Problem Solving and Decreased Safety Awareness    RANGE OF MOTION:  Bilateral Upper Extremity:  Not Tested    STRENGTH:  Bilateral Upper Extremity:  grossly deconditioned    SENSATION:   WFL    ADL:   Feeding: with set-up. pt required assist to take lids off of meal, Min A for problem solving and removing hand from under covers to retrieve utensils  Grooming: with set-up. to wash face off while sitting in bed  Lower Extremity Dressing: Dependent. pulling socks up prior to sitting EOB. BALANCE:  Sitting Balance: Moderate Assistance. Pt tolerated sitting on EOB x4 min prior to reporting \"I want to lay back down\" and self initiating sit to supine. During sitting, pt demoed heavy posterior lean, requiring VC and tactile cues for upright posture. BED MOBILITY:  Rolling to Left: Minimal Assistance with increased time and VC for technique  Supine to Sit: Moderate Assistance with HOB raised, VC for technique  Sit to Supine: Maximum Assistance to guide UB and LB onto bed appropriately  Scooting: Moderate Assistance to scoot towards EOB while seated upright    TRANSFERS:  Not tested d/t heavy posterior lean during sitting and pt self initiating sit to supine    FUNCTIONAL MOBILITY:  Not tested    Activity Tolerance:  Patient tolerance of  treatment: fair. Very slow processing throughout, requiring frequent VC. Pt noted to demo very low activity tolerance this date. Assessment:  Assessment: Pt presented with the listed deficits s/p admission with elevated troponin levels. Pt with decreased strength, endurance, activity tolerance, and balance and currently requires Mod A for sitting balance d/t posterior lean. Pt would benefit from continued OT to restore PLOF maximize pt's indep with ADLs and IADLs in prep for a safe return home at max level of indep.     Performance deficits / Impairments: Decreased functional mobility , Decreased balance, Decreased safe awareness, Decreased ADL status, Decreased high-level IADLs, Decreased endurance, Decreased strength  Prognosis: Fair  REQUIRES OT FOLLOW UP: Yes  Decision Making: Medium Complexity  Safety Devices in place: Yes  Type of devices: All fall risk precautions in place, Bed alarm in place, Left in bed, Call light within reach    Treatment Initiated: Treatment and education initiated within context of evaluation. Evaluation time included review of current medical information, gathering information related to past medical, social and functional history, completion of standardized testing, formal and informal observation of tasks, assessment of data and development of plan of care and goals. Treatment time included skilled education and facilitation of tasks to increase safety and independence with ADL's for improved functional independence and quality of life. Discharge Recommendations:  Subacute/Skilled Nursing Facility    Patient Education:  OT Education: OT Role, Plan of Care, ADL Adaptive Strategies, Transfer Training, Energy Conservation, IADL Safety    Equipment Recommendations:  Equipment Needed: No    Plan:  Times per week: 5x  Times per day: Daily  Current Treatment Recommendations: Strengthening, Balance Training, Functional Mobility Training, Endurance Training, Home Management Training, Patient/Caregiver Education & Training, Self-Care / ADL, Safety Education & Training. See long-term goal time frame for expected duration of plan of care. If no long-term goals established, a short length of stay is anticipated.     Goals:  Patient goals : return to assisted living  Short term goals  Time Frame for Short term goals: by discharge  Short term goal 1: Pt will complete EOB ADL with no > than Min A to increase indep with grooming  Short term goal 2: Pt will tolerate sitting EOB x5 min with CGA and 0-2 VC for posture to increase indep with ADLs  Short term goal 3: Pt will complete light BUE strengthening HEP with 0-2 VC for technique to increase ease with ADL routine  Short term goal 4: Pt will tolerate OTR to assess t/fs and mobility when appropriate         Following session, patient left in safe position with all fall risk precautions in place.

## 2021-08-20 NOTE — DISCHARGE SUMMARY
DISCHARGE SUMMARY      Patient Identification:   Jimbo Valladares   : 1934  MRN: [id number]   Account: [de-identified]      Patient's PCP: Alexa Diaz MD    Admit Date: 2021     Discharge Date:   2021    Admitting Physician: Britni Morejon MD     Discharge Physician: Renetta Rangel MD     Discharge Diagnoses: Active Hospital Problems    Diagnosis Date Noted    Elevated troponin level [R77.8] 2021    Altered mental status [R41.82] 2021    Hyperglycemia [R73.9]     Type 2 MI (myocardial infarction) ([facility] [I21. A1] 2021    Type 2 diabetes mellitus without complication ([facility] [X14.5] 2017    D[facility] [F03.90] 2017    Acute cystitis [N30.00] 2017    Hypothyroidism [E03.9] 2017       The patient was seen and examined on day of discharge and this discharge summary is in conjunction with any daily progress note from day of discharge. Hospital Course:   Jimbo Valladares is a 80 y.o. female admitted to [facility] on 2021 for altered mental status. Per HPI  \"Gissel Ham is a 80 y.o. female who presents to the emergency department for evaluation of her mental status.  Per report patient was at nursing home today and appeared to be staring into space and drooling for approximately 5 to 10 minutes.  When spoken to she would not respond.  Per report of nursing home patient has been more confused than usual today. Ro Priyanka denies any pain or discomfort.  Patient denies any chest pain, shortness of breath, or syncope. The patient has no other acute complaints at this time. \"     Since admission, spoke with family who said patient appeared to be at her baseline. No concern from family and want to proceed with keeping patient comfortable. Patient denying symptoms. Also of note, had troponins trended which did slightly increase (.027 - 0.41), but patient denied symptoms. Likely type 2 MI.   Is DNR-CC and cardiology agreed patient would not benefit from cardiology procedure based on goals of care. Did have UTI noted initially. Given diflucanx1 and started on levaquin for 5 days, final results showed mixed skin lyric. Levaquin discontinued. Also had brief episode of asymptomatic bradycardia, so metoprolol decreased from 50mg XL to 25mg XL      Exam:     Vitals:  Vitals:    08/19/21 1625 08/19/21 1800 08/19/21 2015 08/20/21 0845   BP: 123/61 130/60 (!) 112/56 (!) 121/58   Pulse: 82 61 58 75   Resp: 16 16 18 16   Temp: 101.1 °F (38.4 °C) 97.9 °F (36.6 °C) 98.4 °F (36.9 °C) 97.9 °F (36.6 °C)   TempSrc: Oral Axillary Oral Axillary   SpO2: 95% 96% 98% 97%   Weight:         Weight: Weight: 111 lb 9.6 oz (50.6 kg)     24 hour intake/output:    Intake/Output Summary (Last 24 hours) at 8/20/2021 1223  Last data filed at 8/20/2021 0917  Gross per 24 hour   Intake 110 ml   Output 0 ml   Net 110 ml         Physical Exam  Vitals and nursing note reviewed. Constitutional:       General: She is not in acute distress. HENT:      Head: Normocephalic and atraumatic. Nose: Nose normal.      Mouth/Throat:      Mouth: Mucous membranes are moist.      Pharynx: Oropharynx is clear. Eyes:      Extraocular Movements: Extraocular movements intact. Pupils: Pupils are equal, round, and reactive to light. Cardiovascular:      Rate and Rhythm: Normal rate and regular rhythm. Pulses: Normal pulses. Heart sounds: Normal heart sounds. Pulmonary:      Effort: Pulmonary effort is normal.      Breath sounds: Normal breath sounds. Abdominal:      Palpations: Abdomen is soft. Tenderness: There is no abdominal tenderness. Musculoskeletal:         General: No signs of injury. Normal range of motion. Cervical back: Normal range of motion and neck supple. Skin:     General: Skin is warm and dry. Capillary Refill: Capillary refill takes less than 2 seconds. Neurological:      General: No focal deficit present. Mental Status: She is alert and oriented to person, place, and time. Mental status is at baseline. Psychiatric:         Mood and Affect: Mood normal.         Behavior: Behavior normal.           Labs: For convenience and continuity at follow-up the following most recent labs are provided:      CBC:    Lab Results   Component Value Date    WBC 5.8 08/20/2021    HGB 11.5 08/20/2021    HCT 33.6 08/20/2021    PLT 97 08/20/2021       Renal:    Lab Results   Component Value Date     08/20/2021    K 4.1 08/20/2021    CL 97 08/20/2021    CO2 28 08/20/2021    BUN 22 08/20/2021    CREATININE 0.9 08/20/2021    CALCIUM 8.6 08/20/2021         Significant Diagnostic Studies    Radiology:   XR CHEST (2 VW)   Final Result   There is no acute intrathoracic process. **This report has been created using voice recognition software. It may contain minor errors which are inherent in voice recognition technology. **      Final report electronically signed by Dr Mary Walker on 8/18/2021 4:00 PM      CT HEAD WO CONTRAST   Final Result      1. No acute intracranial hemorrhage, mass effect or midline shift. 2. Chronic senescent changes of the brain including generalized atrophy and chronic microvascular ischemic changes in the white matter. **This report has been created using voice recognition software. It may contain minor errors which are inherent in voice recognition technology. **      Final report electronically signed by Dr Mary Walker on 8/18/2021 3:45 PM             Consults:     IP CONSULT TO SOCIAL WORK    Disposition:    [] Home       [] TCU       [] Rehab       [] Psych       [] SNF       [x] [place]       [] Other-    Condition at Discharge: Stable    Code Status:  DNR-CC     Patient Instructions:    Discharge lab work: None  Activity: activity as tolerated  Diet: ADULT DIET; Regular;  Low Sodium (2 gm)      Follow-up visits:   [facility]  [address] [facility] [address] [facility]  [phone]    [facility] physician to follow         Discharge Medications:      Naye Burkett   Home Medication Instructions QQC:[id number]    Printed on:08/20/21 1223   Medication Information                      amLODIPine (NORVASC) 5 MG tablet  Take 1 tablet by mouth daily             calcium-vitamin D (OSCAL-500) 500-200 MG-UNIT per tablet  Take 1 tablet by mouth daily Calcium 600 mg and   D3 800 IU             divalproex (DEPAKOTE SPRINKLE) 125 MG capsule  Take 125 mg by mouth nightly             glipiZIDE (GLUCOTROL) 5 MG tablet  Take 1 tablet by mouth every morning (before breakfast)             levothyroxine (SYNTHROID) 50 MCG tablet  Take 1 tablet by mouth Daily             lisinopril (PRINIVIL;ZESTRIL) 20 MG tablet  Take 1 tablet by mouth 2 times daily             metFORMIN (GLUCOPHAGE-XR) 500 MG extended release tablet  Take 500 mg by mouth daily (with breakfast)             metoprolol succinate (TOPROL XL) 25 MG extended release tablet  Take 1 tablet by mouth daily             Multiple Vitamins-Minerals (THERAPEUTIC MULTIVITAMIN-MINERALS) tablet  Take 1 tablet by mouth daily                 Time Spent on discharge is more than 30 minutes in the examination, evaluation, counseling and review of medications and discharge plan. Signed: Thank you Naman Stephens MD for the opportunity to be involved in this patient's care.     Electronically signed by Jenna Ernst MD on 8/20/2021 at 12:23 PM

## 2022-01-04 ENCOUNTER — OUTSIDE SERVICES (OUTPATIENT)
Dept: FAMILY MEDICINE CLINIC | Age: 87
End: 2022-01-04
Payer: COMMERCIAL

## 2022-01-04 DIAGNOSIS — R25.1 TREMOR, UNSPECIFIED: ICD-10-CM

## 2022-01-04 DIAGNOSIS — I10 ESSENTIAL (PRIMARY) HYPERTENSION: ICD-10-CM

## 2022-01-04 DIAGNOSIS — E03.9 HYPOTHYROIDISM, UNSPECIFIED TYPE: ICD-10-CM

## 2022-01-04 DIAGNOSIS — I21.4 NON-ST ELEVATION MYOCARDIAL INFARCTION (NSTEMI) (HCC): ICD-10-CM

## 2022-01-04 DIAGNOSIS — Z79.01 LONG TERM (CURRENT) USE OF ANTICOAGULANTS: ICD-10-CM

## 2022-01-04 DIAGNOSIS — S01.20XA OPEN WOUND OF NOSE, UNSPECIFIED OPEN WOUND TYPE, INITIAL ENCOUNTER: ICD-10-CM

## 2022-01-04 DIAGNOSIS — M62.81 MUSCLE WEAKNESS (GENERALIZED): ICD-10-CM

## 2022-01-04 DIAGNOSIS — R73.9 HYPERGLYCEMIA: ICD-10-CM

## 2022-01-04 DIAGNOSIS — I51.7 CARDIOMEGALY: ICD-10-CM

## 2022-01-04 DIAGNOSIS — Z79.4 LONG TERM (CURRENT) USE OF INSULIN (HCC): ICD-10-CM

## 2022-01-04 DIAGNOSIS — F01.50 VASCULAR DEMENTIA WITHOUT BEHAVIORAL DISTURBANCE (HCC): Primary | ICD-10-CM

## 2022-01-04 DIAGNOSIS — R13.12 DYSPHAGIA, OROPHARYNGEAL PHASE: ICD-10-CM

## 2022-01-04 DIAGNOSIS — E11.9 TYPE 2 DIABETES MELLITUS WITHOUT COMPLICATION, WITHOUT LONG-TERM CURRENT USE OF INSULIN (HCC): ICD-10-CM

## 2022-01-04 PROBLEM — G47.00 INSOMNIA, UNSPECIFIED: Status: ACTIVE | Noted: 2022-01-04

## 2022-01-04 PROBLEM — R41.82 ALTERED MENTAL STATUS: Status: RESOLVED | Noted: 2021-08-19 | Resolved: 2022-01-04

## 2022-01-04 PROCEDURE — 99380 NURSING FAC CARE SUPERVISION: CPT | Performed by: NURSE PRACTITIONER

## 2022-01-04 PROCEDURE — 99310 SBSQ NF CARE HIGH MDM 45: CPT | Performed by: NURSE PRACTITIONER

## 2022-01-04 NOTE — PROGRESS NOTES
[facility] [address] Progress Note    NAME: Miah Mann  DATE: 22  ROOM #: A 01-1  CODE STATUS: DNR/CCA  CHIEF COMPLAINT:  Routine visit  : 1934    History obtained from chart review, staff due to patient's dementia. SUBJECTIVE:  HPI: Miah Mann is a 80 y.o. female. PMH documented below. Pt is assessed at bedside this morning and is noted to be in no acute distress. She is alert and follows simple commands and has no questions or concerns. She is formerly a patient of Dr. Kimo Whitten MD, and will transition care to Dr. Patricia Palacios MD as Dr. Мария Eubanks will no longer round at [place] [address]. Staff provide updates. Appreciate their insight. Allergies and Medications were reviewed through the [facility] EMR. All medications reviewed and reconciled, including OTC and herbal medications. Patient Active Problem List    Diagnosis Date Noted    Muscle weakness (generalized) 2022    Dysphagia, oropharyngeal phase 2022    Essential (primary) hypertension 2022    Long term (current) use of anticoagulants 2022    Long term (current) use of insulin ([address] 2022    Insomnia, unspecified 2022    Cardiomegaly 2022    Tremor, unspecified 2022    Open wound of nose 2022    Elevated troponin level 2021    Hyperglycemia     Non-ST elevation myocardial infarction (NSTEMI) ([address] 2021    Type 2 diabetes mellitus without complication ([address]     Multiple falls 2017    Acute cystitis 2017    Pain and swelling of ankle 2017    Unspecified dementia without behavioral disturbance ([address] 2017    Hypothyroidism 2017       Past Medical History:   Diagnosis Date    Arthritis     Diabetes mellitus ([address]     Hypertension     Thyroid disease        Past Surgical History:   Procedure Laterality Date    COLONOSCOPY      HYSTERECTOMY  mid ? ?    partial       Allergies   Allergen Reactions    Pcn [Penicillins] Rash       Social History     Tobacco Use    Smoking status: Never Smoker    Smokeless tobacco: Never Used   Substance Use Topics    Alcohol use: No        Family History   Problem Relation Age of Onset    Heart Disease Father     Heart Disease Brother     High Blood Pressure Brother     Diabetes Brother     High Blood Pressure Sister     High Blood Pressure Sister     Diabetes Sister        Review of Systems  Positive responses are highlighted in bold    Constitutional:  Fever, Chills, Night Sweats, Fatigue, Unexpected changes in weight  Eyes:  Eye discharge, Eye pain, Eye redness, Visual disturbances   HENT:  Ear pain, Tinnitus, Nosebleeds, Trouble swallowing, Hearing loss, Sore throat  Cardiovascular:  Chest Pain, Palpitations, Orthopnea, Paroxysmal Nocturnal Dyspnea  Respiratory:  Cough, Wheezing, Shortness of breath, Chest tightness, Apnea  Gastrointestinal:  Nausea, Vomiting, Diarrhea, Constipation, Heartburn, Blood in stool  Genitourinary:  Difficulty or painful urination, Flank pain, Change in frequency, Urgency  Skin:  Color change, Rash, Itching, Wound  Psychiatric:  Hallucinations, Anxiety, Depression, Suicidal ideation  Hematological:  Enlarged glands, Easy bleeding, Easily bruising  Musculoskeletal:  Joint pain, Back pain, Gait problems, Joint swelling, Myalgias  Neurological:  Dizziness, Headaches, Presyncope, Numbness, Seizures, Tremors  Allergy:  Environmental allergies, Food allergies  Endocrine:  Heat Intolerance, Cold Intolerance, Polydipsia, Polyphagia, Polyuria    PHYSICAL EXAM:  VS:  130/49, 97.3, 65, 211 blood glucose, 97% on room air  Weight in pounds:  94.4  Pain: Denies    VS Reviewed  General Appearance: Debilitated. Thin. Appears stated age.   Head: normocephalic and atraumatic  Eyes: pupils equal, round, and reactive to light, conjunctivae and eye lids without erythema  ENT: external ear and ear canal normal bilaterally, nose without deformity, nasal mucosa and turbinates normal without polyps, oropharynx normal, dentition is normal for age, no lip or gum lesions noted  Neck: supple and non-tender without mass, no thyromegaly or thyroid nodules, no cervical lymphadenopathy  Pulmonary/Chest: clear to auscultation bilaterally- no wheezes, rales or rhonchi, normal air movement, no respiratory distress or retractions. Requires no supplemental oxygen at time of assessment. Cardiovascular: normal rate, regular rhythm, normal S1 and S2, no murmurs, rubs, clicks, or gallops, distal pulses intact  Abdomen: soft, non-tender, non-distended, bowel sounds physiologic,  no rebound or guarding, no masses or hernias noted. Liver and spleen without enlargement. Extremities: no cyanosis, clubbing or edema of the lower extremities  Musculoskeletal: No joint swelling or gross deformity; decreased muscle mass. Neuro:  Alert, 3/5 strength globally and symmetrically, 2+ patellar reflexes b/l,  normal speech, no focal findings or movement disorder noted; she has a mild tremor at rest to right hand. Speech is soft. She is able to follow simple commands. She requires assistance with all ADLs. Psych:  Normal affect without evidence of depression or anxiety, insight and judgement are impaired, memory appears impaired. Skin: pink, warm and dry, no rash or erythema; there is a skin excoriation to the right side of her nose. Lymph:  No cervical, auricular or supraclavicular lymph nodes palpated    LABS/IMAGING    Asking for CBC, CMP, Lipid Panel, TSH with reflex to Free T4, A1C, serum vitamin D on 1/6/22. ASSESSMENT & PLAN    1. Muscle weakness (generalized)  Continue PT/OT as tolerates. 2. Dysphagia, oropharyngeal phase  Continue ST as tolerates. Continue regular diet, puree texture, regular consistency. 3.  Protein calorie malnourishment  Gradual weight loss since admission continues. Continue med pass shakes. Staff assist patient with meals.   She typically eats 43% of meals. I will check a TSH and CMP on 1/6/22. Asking for weekly weights for now. Continue MVI. 4. Vascular dementia without behavioral disturbance [facility])  Staff report no new or increased behaviors. Continue Depakote as ordered. 5. Type 2 diabetes mellitus without complication, without long-term current use of insulin (HCC)  No episodes of hypoglycemia. Continue glipizide, metformin. A1C monitoring. 6. Hypothyroidism, unspecified type  Continue Levothyroxine 50 mcg daily. Asking for TSH with reflex to Free T4 on 1/6/22.    7. Essential (primary) hypertension  BP at goal with metoprolol, amlodipine, lisinopril. 8.  Tremor, unspecified  Very mild. No need for medications at this time. 9. Open wound of nose, unspecified open wound type, initial encounter  Asking staff to apply LITO twice daily for now. Monitor. Disposition:  Assessment and plan as stated above. Follow up per routine and as warranted. Total time spent on date of service was 50 minutes. Time spent includes some or all of the following, both face-to-face time and non face-to-face, but is not limited to: reviewing records or discussing history or plan with colleagues; obtaining and/or reviewing the history; performing a medically appropriate examination/evaluation; counseling patient and/or caregiver; documentation, placing orders/referrals, and coordinating care.     Plan of care reviewed with Dr. Andrez Quintanilla MD.  Electronically signed by MARYCRUZ Owens NP on 1/4/2022 at 5:07 PM

## 2022-01-13 PROBLEM — E43 SEVERE PROTEIN-CALORIE MALNUTRITION (HCC): Status: ACTIVE | Noted: 2022-01-13

## 2022-03-02 PROBLEM — Z51.5 HOSPICE CARE PATIENT: Status: ACTIVE | Noted: 2022-03-02
